# Patient Record
Sex: MALE | Race: WHITE | NOT HISPANIC OR LATINO | URBAN - METROPOLITAN AREA
[De-identification: names, ages, dates, MRNs, and addresses within clinical notes are randomized per-mention and may not be internally consistent; named-entity substitution may affect disease eponyms.]

---

## 2018-03-30 ENCOUNTER — INPATIENT (INPATIENT)
Facility: HOSPITAL | Age: 43
LOS: 6 days | Discharge: HOME | End: 2018-04-06
Attending: PSYCHIATRY & NEUROLOGY | Admitting: PSYCHIATRY & NEUROLOGY
Payer: MEDICARE

## 2018-03-30 VITALS
TEMPERATURE: 98 F | OXYGEN SATURATION: 99 % | HEART RATE: 89 BPM | RESPIRATION RATE: 18 BRPM | SYSTOLIC BLOOD PRESSURE: 156 MMHG | DIASTOLIC BLOOD PRESSURE: 93 MMHG

## 2018-03-30 DIAGNOSIS — F33.9 MAJOR DEPRESSIVE DISORDER, RECURRENT, UNSPECIFIED: ICD-10-CM

## 2018-03-30 DIAGNOSIS — R69 ILLNESS, UNSPECIFIED: ICD-10-CM

## 2018-03-30 DIAGNOSIS — F25.0 SCHIZOAFFECTIVE DISORDER, BIPOLAR TYPE: ICD-10-CM

## 2018-03-30 LAB
ALBUMIN SERPL ELPH-MCNC: 4.7 G/DL — SIGNIFICANT CHANGE UP (ref 3.3–5)
ALP SERPL-CCNC: 75 U/L — SIGNIFICANT CHANGE UP (ref 40–120)
ALT FLD-CCNC: 33 U/L — SIGNIFICANT CHANGE UP (ref 4–41)
AMPHET UR-MCNC: NEGATIVE — SIGNIFICANT CHANGE UP
APAP SERPL-MCNC: < 15 UG/ML — LOW (ref 15–25)
APPEARANCE UR: CLEAR — SIGNIFICANT CHANGE UP
AST SERPL-CCNC: 20 U/L — SIGNIFICANT CHANGE UP (ref 4–40)
BARBITURATES UR SCN-MCNC: NEGATIVE — SIGNIFICANT CHANGE UP
BASOPHILS # BLD AUTO: 0.05 K/UL — SIGNIFICANT CHANGE UP (ref 0–0.2)
BASOPHILS NFR BLD AUTO: 0.7 % — SIGNIFICANT CHANGE UP (ref 0–2)
BENZODIAZ UR-MCNC: NEGATIVE — SIGNIFICANT CHANGE UP
BILIRUB SERPL-MCNC: 0.9 MG/DL — SIGNIFICANT CHANGE UP (ref 0.2–1.2)
BILIRUB UR-MCNC: NEGATIVE — SIGNIFICANT CHANGE UP
BLOOD UR QL VISUAL: NEGATIVE — SIGNIFICANT CHANGE UP
BUN SERPL-MCNC: 18 MG/DL — SIGNIFICANT CHANGE UP (ref 7–23)
CALCIUM SERPL-MCNC: 9.9 MG/DL — SIGNIFICANT CHANGE UP (ref 8.4–10.5)
CANNABINOIDS UR-MCNC: NEGATIVE — SIGNIFICANT CHANGE UP
CHLORIDE SERPL-SCNC: 101 MMOL/L — SIGNIFICANT CHANGE UP (ref 98–107)
CO2 SERPL-SCNC: 27 MMOL/L — SIGNIFICANT CHANGE UP (ref 22–31)
COCAINE METAB.OTHER UR-MCNC: NEGATIVE — SIGNIFICANT CHANGE UP
COLOR SPEC: YELLOW — SIGNIFICANT CHANGE UP
CREAT SERPL-MCNC: 0.96 MG/DL — SIGNIFICANT CHANGE UP (ref 0.5–1.3)
EOSINOPHIL # BLD AUTO: 0.2 K/UL — SIGNIFICANT CHANGE UP (ref 0–0.5)
EOSINOPHIL NFR BLD AUTO: 2.8 % — SIGNIFICANT CHANGE UP (ref 0–6)
ETHANOL BLD-MCNC: < 10 MG/DL — SIGNIFICANT CHANGE UP
GLUCOSE SERPL-MCNC: 98 MG/DL — SIGNIFICANT CHANGE UP (ref 70–99)
GLUCOSE UR-MCNC: NEGATIVE — SIGNIFICANT CHANGE UP
HCT VFR BLD CALC: 49.9 % — SIGNIFICANT CHANGE UP (ref 39–50)
HGB BLD-MCNC: 16 G/DL — SIGNIFICANT CHANGE UP (ref 13–17)
IMM GRANULOCYTES # BLD AUTO: 0.01 # — SIGNIFICANT CHANGE UP
IMM GRANULOCYTES NFR BLD AUTO: 0.1 % — SIGNIFICANT CHANGE UP (ref 0–1.5)
KETONES UR-MCNC: NEGATIVE — SIGNIFICANT CHANGE UP
LEUKOCYTE ESTERASE UR-ACNC: NEGATIVE — SIGNIFICANT CHANGE UP
LYMPHOCYTES # BLD AUTO: 2.18 K/UL — SIGNIFICANT CHANGE UP (ref 1–3.3)
LYMPHOCYTES # BLD AUTO: 30.2 % — SIGNIFICANT CHANGE UP (ref 13–44)
MCHC RBC-ENTMCNC: 29.5 PG — SIGNIFICANT CHANGE UP (ref 27–34)
MCHC RBC-ENTMCNC: 32.1 % — SIGNIFICANT CHANGE UP (ref 32–36)
MCV RBC AUTO: 92.1 FL — SIGNIFICANT CHANGE UP (ref 80–100)
METHADONE UR-MCNC: NEGATIVE — SIGNIFICANT CHANGE UP
MONOCYTES # BLD AUTO: 0.51 K/UL — SIGNIFICANT CHANGE UP (ref 0–0.9)
MONOCYTES NFR BLD AUTO: 7.1 % — SIGNIFICANT CHANGE UP (ref 2–14)
MUCOUS THREADS # UR AUTO: SIGNIFICANT CHANGE UP
NEUTROPHILS # BLD AUTO: 4.28 K/UL — SIGNIFICANT CHANGE UP (ref 1.8–7.4)
NEUTROPHILS NFR BLD AUTO: 59.1 % — SIGNIFICANT CHANGE UP (ref 43–77)
NITRITE UR-MCNC: NEGATIVE — SIGNIFICANT CHANGE UP
NRBC # FLD: 0 — SIGNIFICANT CHANGE UP
OPIATES UR-MCNC: NEGATIVE — SIGNIFICANT CHANGE UP
OXYCODONE UR-MCNC: NEGATIVE — SIGNIFICANT CHANGE UP
PCP UR-MCNC: NEGATIVE — SIGNIFICANT CHANGE UP
PH UR: 6 — SIGNIFICANT CHANGE UP (ref 4.6–8)
PLATELET # BLD AUTO: 225 K/UL — SIGNIFICANT CHANGE UP (ref 150–400)
PMV BLD: 10.2 FL — SIGNIFICANT CHANGE UP (ref 7–13)
POTASSIUM SERPL-MCNC: 4.3 MMOL/L — SIGNIFICANT CHANGE UP (ref 3.5–5.3)
POTASSIUM SERPL-SCNC: 4.3 MMOL/L — SIGNIFICANT CHANGE UP (ref 3.5–5.3)
PROT SERPL-MCNC: 7.1 G/DL — SIGNIFICANT CHANGE UP (ref 6–8.3)
PROT UR-MCNC: NEGATIVE MG/DL — SIGNIFICANT CHANGE UP
RBC # BLD: 5.42 M/UL — SIGNIFICANT CHANGE UP (ref 4.2–5.8)
RBC # FLD: 12.5 % — SIGNIFICANT CHANGE UP (ref 10.3–14.5)
RBC CASTS # UR COMP ASSIST: SIGNIFICANT CHANGE UP (ref 0–?)
SALICYLATES SERPL-MCNC: < 5 MG/DL — LOW (ref 15–30)
SODIUM SERPL-SCNC: 141 MMOL/L — SIGNIFICANT CHANGE UP (ref 135–145)
SP GR SPEC: 1.01 — SIGNIFICANT CHANGE UP (ref 1–1.04)
TSH SERPL-MCNC: 0.64 UIU/ML — SIGNIFICANT CHANGE UP (ref 0.27–4.2)
UROBILINOGEN FLD QL: NORMAL MG/DL — SIGNIFICANT CHANGE UP
WBC # BLD: 7.23 K/UL — SIGNIFICANT CHANGE UP (ref 3.8–10.5)
WBC # FLD AUTO: 7.23 K/UL — SIGNIFICANT CHANGE UP (ref 3.8–10.5)

## 2018-03-30 PROCEDURE — 99285 EMERGENCY DEPT VISIT HI MDM: CPT

## 2018-03-30 RX ORDER — HYDROXYZINE HCL 10 MG
50 TABLET ORAL EVERY 6 HOURS
Qty: 0 | Refills: 0 | Status: DISCONTINUED | OUTPATIENT
Start: 2018-03-30 | End: 2018-04-06

## 2018-03-30 RX ORDER — ZOLPIDEM TARTRATE 10 MG/1
5 TABLET ORAL AT BEDTIME
Qty: 0 | Refills: 0 | Status: DISCONTINUED | OUTPATIENT
Start: 2018-03-30 | End: 2018-04-04

## 2018-03-30 RX ORDER — HALOPERIDOL DECANOATE 100 MG/ML
5 INJECTION INTRAMUSCULAR ONCE
Qty: 0 | Refills: 0 | Status: DISCONTINUED | OUTPATIENT
Start: 2018-03-30 | End: 2018-04-06

## 2018-03-30 RX ORDER — CLONAZEPAM 1 MG
0.5 TABLET ORAL ONCE
Qty: 0 | Refills: 0 | Status: DISCONTINUED | OUTPATIENT
Start: 2018-03-30 | End: 2018-03-30

## 2018-03-30 RX ORDER — CLONAZEPAM 1 MG
0.5 TABLET ORAL EVERY 8 HOURS
Qty: 0 | Refills: 0 | Status: DISCONTINUED | OUTPATIENT
Start: 2018-03-30 | End: 2018-04-04

## 2018-03-30 RX ORDER — CLONAZEPAM 1 MG
0.5 TABLET ORAL
Qty: 0 | Refills: 0 | Status: DISCONTINUED | OUTPATIENT
Start: 2018-03-30 | End: 2018-04-02

## 2018-03-30 RX ORDER — HALOPERIDOL DECANOATE 100 MG/ML
5 INJECTION INTRAMUSCULAR
Qty: 0 | Refills: 0 | Status: DISCONTINUED | OUTPATIENT
Start: 2018-03-30 | End: 2018-03-31

## 2018-03-30 RX ORDER — HALOPERIDOL DECANOATE 100 MG/ML
5 INJECTION INTRAMUSCULAR EVERY 6 HOURS
Qty: 0 | Refills: 0 | Status: DISCONTINUED | OUTPATIENT
Start: 2018-03-30 | End: 2018-04-06

## 2018-03-30 RX ORDER — LANOLIN ALCOHOL/MO/W.PET/CERES
1 CREAM (GRAM) TOPICAL AT BEDTIME
Qty: 0 | Refills: 0 | Status: DISCONTINUED | OUTPATIENT
Start: 2018-03-30 | End: 2018-04-02

## 2018-03-30 RX ORDER — DIPHENHYDRAMINE HCL 50 MG
50 CAPSULE ORAL ONCE
Qty: 0 | Refills: 0 | Status: DISCONTINUED | OUTPATIENT
Start: 2018-03-30 | End: 2018-04-06

## 2018-03-30 RX ADMIN — HALOPERIDOL DECANOATE 5 MILLIGRAM(S): 100 INJECTION INTRAMUSCULAR at 21:45

## 2018-03-30 RX ADMIN — HALOPERIDOL DECANOATE 5 MILLIGRAM(S): 100 INJECTION INTRAMUSCULAR at 21:43

## 2018-03-30 RX ADMIN — Medication 0.5 MILLIGRAM(S): at 16:23

## 2018-03-30 RX ADMIN — ZOLPIDEM TARTRATE 5 MILLIGRAM(S): 10 TABLET ORAL at 21:44

## 2018-03-30 RX ADMIN — Medication 0.5 MILLIGRAM(S): at 21:43

## 2018-03-30 RX ADMIN — Medication 0.5 MILLIGRAM(S): at 21:45

## 2018-03-30 RX ADMIN — Medication 1 MILLIGRAM(S): at 22:18

## 2018-03-30 NOTE — ED PROVIDER NOTE - MEDICAL DECISION MAKING DETAILS
43 y/o M hx  Schizophrenia   Labs, Urine Tox/UA, EKG . Small superficial linear abrasion to left wrist. No indication for sutures.    Medical evaluation performed. There is no clinical evidence of intoxication or any acute medical problem requiring immediate intervention. Patient is awaiting psychiatric consultation. Final disposition will be determined by psychiatrist.

## 2018-03-30 NOTE — ED ADULT NURSE REASSESSMENT NOTE - NS ED NURSE REASSESS COMMENT FT1
Patient medication given as ordered, pt admitted to [place] [id number], report given to Moncho TONEY, pt transported to [place] [id number] via EMS, safety maintained.

## 2018-03-30 NOTE — ED BEHAVIORAL HEALTH ASSESSMENT NOTE - CASE SUMMARY
42M with schizoaffective disorder, sent by outpatient psychiatrist for suicidal threats and medication non compliance. Patient has psychosocial stressors of an ill parent and sister staying with him to help care for parent. He has been refusing medications for some time. He has been increasingly stressed and sent his sister a text threatening suicide. Patient is quite guarded and attempting to rationalize events, however he is at extremely high risk for suicide given his history of serious attempt. He also appears to have a superficial lac on wrist, which he states is from a bracelet but may be self inflicted. He is an acute risk of harm to himself and requires admission for safety and stabilization. I agree with plan as above. Patient meets criteria for 939 but does not need constant observation in a locked supervised setting. EMS transport.

## 2018-03-30 NOTE — ED ADULT NURSE NOTE - CHIEF COMPLAINT QUOTE
Patient brought to ER from [facility] by EMS. Patient sent depressed text messages to sister and has history of suicidal attempts so sister called EMS. Pt states that he does not need to be here.

## 2018-03-30 NOTE — ED BEHAVIORAL HEALTH ASSESSMENT NOTE - DETAILS
family hx of anxiety and depression Dr. Ahumada agrees suicide attempt in past by stabbing self Dr Goldman

## 2018-03-30 NOTE — ED ADULT NURSE NOTE - NSSISCREENINGQ1_ED_A_ED
Nursing Admit Note:17  yr. old admitted to Partial treatment after D/C from 6A .  History of OD. Multiple suicide attempts  .  Stressors include family and school.  NKDA.  On Wellbutrin XL, Prazosin, Hydroxyzine, and Melatonin .  See admit form for details.  A: Anxious mood and flat affect.  I:  Oriented to unit.  P:  Family therapy, positive coping skills, increase self-esteem, gain social skills, med monitoring, monitor drug use (past history), monitor safety, school planning.   No

## 2018-03-30 NOTE — ED ADULT NURSE NOTE - OBJECTIVE STATEMENT
Received pt in  pt calm & cooperative sent from  for eval pt denies Si/Hi/AVh at present eval on going.

## 2018-03-30 NOTE — ED BEHAVIORAL HEALTH ASSESSMENT NOTE - DESCRIPTION
sleep apnea see hpi superficially cooperative, guarded  ICU Vital Signs Last 24 Hrs  T(C): 36.7 (30 Mar 2018 12:24), Max: 36.7 (30 Mar 2018 12:24)  T(F): 98 (30 Mar 2018 12:24), Max: 98 (30 Mar 2018 12:24)  HR: 89 (30 Mar 2018 12:24) (89 - 89)  BP: 156/93 (30 Mar 2018 12:24) (156/93 - 156/93)  BP(mean): --  ABP: --  ABP(mean): --  RR: 18 (30 Mar 2018 12:24) (18 - 18)  SpO2: 99% (30 Mar 2018 12:24) (99% - 99%)

## 2018-03-30 NOTE — ED BEHAVIORAL HEALTH ASSESSMENT NOTE - PSYCHIATRIC ISSUES AND PLAN (INCLUDE STANDING AND PRN MEDICATION)
Restart Haldol 5mg BID for psychosis, consider KIMBROUGH & pursuing AOT order,Continue Klonopin 0.5mg BID. Continue Ambien CR 12.5mg q HS. Provide Haldol 5mg q 6 hours PRN for agitation (IM or p.o.), Ativan 2mg q 6 hours PRN for agitation (IM or p.o.), Benadryl 50mg q 6 hours PRN for agitation (IM or p.o.)

## 2018-03-30 NOTE — ED PROVIDER NOTE - OBJECTIVE STATEMENT
43 y/o M hx  Schizophrenia BIBA from [facility]  w c/o worsening depressive symptoms  and suicidal ideations . EMS reported that patient sent his sister multiple suicidal statements via text. Admits to medication compliance. Denies falling, punching or kicking any objects. Denies pain, SOB, fever, chills, chest/abdominal discomfort. Denies HI/AH/VH. Denies use of alcohol  or illicit drug.

## 2018-03-30 NOTE — ED BEHAVIORAL HEALTH ASSESSMENT NOTE - SUMMARY
43 y/o single white male, domiciled with parents, no dependents, disabled, history of Schizoaffective Disorder, in outpatient psychiatric treatment with Dr. Ahumada, multiple past psychiatric admissions, history of AOT (ended last year), history of serious suicide attempt in 2013 (stabbed self in chest), no violence hx, no legal problems, no drugs, no ETOH, no DTs, PMH of sleep apnea, BIB EMS from outpatient clinic for evaluation as sister reported patient has been making suicidal statements in context of suspected medication noncompliance.  In ED, patient is guarded, evasive, superficially cooperative; but admits to making statements about dying on text message. Sister is stating patient has been giving away passwords & stating something may happen to him; has been stating that he is having suicidal thoughts & being attacked. She states this is not baseline for patient & is very concerned. Dr. Ahumada, who has treated patient for years, is also concerned for pt's safety and supports admission.   Given pt's history, his presentation in ED & given recent concerning statements, cannot safely discharge this patient. Patient will benefit from inpatient psychiatric hospitalization for safety and stabilization. Recommend pursuing AOT order given noncompliance.

## 2018-03-30 NOTE — ED BEHAVIORAL HEALTH ASSESSMENT NOTE - HPI (INCLUDE ILLNESS QUALITY, SEVERITY, DURATION, TIMING, CONTEXT, MODIFYING FACTORS, ASSOCIATED SIGNS AND SYMPTOMS)
43 y/o single white male, domiciled with parents, no dependents, disabled, history of Schizoaffective Disorder, in outpatient psychiatric treatment with Dr. Ahumada, multiple past psychiatric admissions, history of AOT (ended last year), history of serious suicide attempt in 2013 (stabbed self in chest), no violence hx, no legal problems, no drugs, no ETOH, no DTs, PMH of sleep apnea, BIB EMS from outpatient clinic for evaluation as sister reported patient has been making suicidal statements in context of suspected medication noncompliance.    As per collateral obtained by [facility] from Dr. Ahumada (see  note for full details), patient has a history of medication noncompliance when AOT order is discontinued & subsequent destabilization. Dr. Ahumada reports she has been working with patient for years & believes he is not taking psychiatric medications. She reported that she is concerned about patient and supports psychiatric hospitalization. She states sister, who she believes to be reliable, has been reporting patient has been texting suicidal statements & behaving in a bizarre manner recently.   Writer also spoke with Dr. Ahumada & she confirmed that at this time she supports admission for patient. She states that immediately after patient no longer was court mandated to take medication, she believes he stopped taking them. She states she did not agree with the decision to end AOT.    Writer spoke to sister who states that for the past 9 months, patient has been more agitated, delusional & not at baseline. She states that when patient is not on a Decanoate, he immediately stops taking medications. She states that for months, patient has not been well, but she has not been particularly concerned until recently. She states that their father had a stroke recently & therefore she has been staying in the home with him. She states he has been sending her text messages saying he is suicidal, being psychically attacked & reporting dark forces are surrounding him. She states patient has been sending her his passwords to different accounts; she states that he says that something might happen to him & wants family to have access. She states she feels he will hurt himself if discharged. She states that patient needs to have AOT reinstated as he she believes he will become noncompliant again.    In ED, patient is tangential & grandiose & guarded. He is vague about recent events & repeatedly gives contradicting information. He states that he is "totally fine" & requests immediate discharge. He states that he sent his sister some "dramatic text messages" & admits he may have talked about dying but is flippant about the importance of the messages. He states that he has not been sleeping well & states this is because his sister is causing loud noises in the home. He states he has been irritable, with low energy & feeling down. He reports he has been wearing "q-links" on his wrists, ankles & around his abdomen to help to prevent electronic devices from causing him harm, but does not elaborate on exactly how he could be harmed. However, he contradicts himself later as he states he has been feeling "great", is working on many projects including wanting to make paintings, write poetry, have children, get . Patient denies any other psychiatric symptoms and states the only thing that will help him is being discharged.

## 2018-03-30 NOTE — ED BEHAVIORAL HEALTH ASSESSMENT NOTE - CURRENT MEDICATION
suspected to be noncompliant with:  Haldol 10mg q HS, Amitritypline 75mg q HS, Buspar 10mg bid, Gabapentin 100mg TID, Inderal LA 60mg qd, Klonopin 0.5mg TID, Remeron 15mg q HS, Saphris 5mg q HS  Patient reports taking Ambien CR 12.5mg q HS

## 2018-03-30 NOTE — ED BEHAVIORAL HEALTH NOTE - BEHAVIORAL HEALTH NOTE
Spoke with Dr. Ahumada- Patient has schizoaffective disorder and 5 hospitalizations and 1 very SA where he stabbed himself in the chest. Patient is on AOT and does really well and as soon as he goes off he stops all his medication which is what happened. Patient is lying to her about taking medication and his sister is living with him and very concerned about his behavior. Patient texts suicidal statements to sister over the last month but then denying ideation when you ask him. She does think patient has schizotypal personality disorder and possibly narcissism but patient is suicidal, depressed and acting bizarre. Patient has been barricading room with box and speaking with an accent on his phone and spending lots of money ran 20k in debt. Patient is denying all this all her information from the sister.

## 2018-03-30 NOTE — ED BEHAVIORAL HEALTH ASSESSMENT NOTE - RISK ASSESSMENT
Risk factors include history of suicide attempt, hospitalization, severe mental illness, medication noncompliance, suicidal statements. This patient is at risk for harm and requires inpatient level of care for safety and stabilization.

## 2018-03-31 PROCEDURE — 99223 1ST HOSP IP/OBS HIGH 75: CPT

## 2018-03-31 RX ORDER — HALOPERIDOL DECANOATE 100 MG/ML
10 INJECTION INTRAMUSCULAR AT BEDTIME
Qty: 0 | Refills: 0 | Status: DISCONTINUED | OUTPATIENT
Start: 2018-03-31 | End: 2018-04-06

## 2018-03-31 RX ORDER — ASENAPINE MALEATE 10 MG/1
5 TABLET SUBLINGUAL
Qty: 0 | Refills: 0 | Status: DISCONTINUED | OUTPATIENT
Start: 2018-03-31 | End: 2018-03-31

## 2018-03-31 RX ADMIN — ZOLPIDEM TARTRATE 5 MILLIGRAM(S): 10 TABLET ORAL at 20:51

## 2018-03-31 RX ADMIN — Medication 1 MILLIGRAM(S): at 20:51

## 2018-03-31 RX ADMIN — Medication 0.5 MILLIGRAM(S): at 08:25

## 2018-03-31 RX ADMIN — ZOLPIDEM TARTRATE 5 MILLIGRAM(S): 10 TABLET ORAL at 22:14

## 2018-03-31 RX ADMIN — Medication 0.5 MILLIGRAM(S): at 20:51

## 2018-03-31 RX ADMIN — ZOLPIDEM TARTRATE 5 MILLIGRAM(S): 10 TABLET ORAL at 01:25

## 2018-03-31 RX ADMIN — HALOPERIDOL DECANOATE 10 MILLIGRAM(S): 100 INJECTION INTRAMUSCULAR at 20:51

## 2018-03-31 RX ADMIN — Medication 0.5 MILLIGRAM(S): at 18:53

## 2018-03-31 RX ADMIN — HALOPERIDOL DECANOATE 5 MILLIGRAM(S): 100 INJECTION INTRAMUSCULAR at 20:51

## 2018-03-31 RX ADMIN — Medication 50 MILLIGRAM(S): at 05:30

## 2018-03-31 RX ADMIN — HALOPERIDOL DECANOATE 5 MILLIGRAM(S): 100 INJECTION INTRAMUSCULAR at 08:26

## 2018-04-01 PROCEDURE — 99232 SBSQ HOSP IP/OBS MODERATE 35: CPT

## 2018-04-01 RX ADMIN — HALOPERIDOL DECANOATE 10 MILLIGRAM(S): 100 INJECTION INTRAMUSCULAR at 20:58

## 2018-04-01 RX ADMIN — ZOLPIDEM TARTRATE 5 MILLIGRAM(S): 10 TABLET ORAL at 20:58

## 2018-04-01 RX ADMIN — Medication 0.5 MILLIGRAM(S): at 16:27

## 2018-04-01 RX ADMIN — Medication 50 MILLIGRAM(S): at 06:37

## 2018-04-01 RX ADMIN — Medication 1 MILLIGRAM(S): at 20:58

## 2018-04-01 RX ADMIN — ZOLPIDEM TARTRATE 5 MILLIGRAM(S): 10 TABLET ORAL at 23:36

## 2018-04-01 RX ADMIN — Medication 0.5 MILLIGRAM(S): at 08:41

## 2018-04-01 RX ADMIN — ZOLPIDEM TARTRATE 5 MILLIGRAM(S): 10 TABLET ORAL at 03:46

## 2018-04-01 RX ADMIN — Medication 0.5 MILLIGRAM(S): at 20:58

## 2018-04-02 PROCEDURE — 99232 SBSQ HOSP IP/OBS MODERATE 35: CPT

## 2018-04-02 RX ORDER — LANOLIN ALCOHOL/MO/W.PET/CERES
5 CREAM (GRAM) TOPICAL AT BEDTIME
Qty: 0 | Refills: 0 | Status: DISCONTINUED | OUTPATIENT
Start: 2018-04-02 | End: 2018-04-06

## 2018-04-02 RX ORDER — CLONAZEPAM 1 MG
0.5 TABLET ORAL DAILY
Qty: 0 | Refills: 0 | Status: DISCONTINUED | OUTPATIENT
Start: 2018-04-02 | End: 2018-04-06

## 2018-04-02 RX ORDER — CLONAZEPAM 1 MG
1 TABLET ORAL AT BEDTIME
Qty: 0 | Refills: 0 | Status: DISCONTINUED | OUTPATIENT
Start: 2018-04-02 | End: 2018-04-06

## 2018-04-02 RX ORDER — HALOPERIDOL DECANOATE 100 MG/ML
100 INJECTION INTRAMUSCULAR ONCE
Qty: 0 | Refills: 0 | Status: COMPLETED | OUTPATIENT
Start: 2018-04-02 | End: 2018-04-02

## 2018-04-02 RX ADMIN — Medication 5 MILLIGRAM(S): at 20:44

## 2018-04-02 RX ADMIN — ZOLPIDEM TARTRATE 5 MILLIGRAM(S): 10 TABLET ORAL at 23:01

## 2018-04-02 RX ADMIN — Medication 1 MILLIGRAM(S): at 20:45

## 2018-04-02 RX ADMIN — HALOPERIDOL DECANOATE 10 MILLIGRAM(S): 100 INJECTION INTRAMUSCULAR at 20:44

## 2018-04-02 RX ADMIN — Medication 0.5 MILLIGRAM(S): at 08:54

## 2018-04-02 RX ADMIN — Medication 50 MILLIGRAM(S): at 00:57

## 2018-04-02 RX ADMIN — HALOPERIDOL DECANOATE 100 MILLIGRAM(S): 100 INJECTION INTRAMUSCULAR at 15:00

## 2018-04-02 RX ADMIN — Medication 0.5 MILLIGRAM(S): at 13:05

## 2018-04-02 RX ADMIN — ZOLPIDEM TARTRATE 5 MILLIGRAM(S): 10 TABLET ORAL at 20:44

## 2018-04-03 PROCEDURE — 99232 SBSQ HOSP IP/OBS MODERATE 35: CPT

## 2018-04-03 RX ORDER — ACETAMINOPHEN 500 MG
650 TABLET ORAL EVERY 6 HOURS
Qty: 0 | Refills: 0 | Status: DISCONTINUED | OUTPATIENT
Start: 2018-04-03 | End: 2018-04-06

## 2018-04-03 RX ADMIN — ZOLPIDEM TARTRATE 5 MILLIGRAM(S): 10 TABLET ORAL at 22:14

## 2018-04-03 RX ADMIN — Medication 0.5 MILLIGRAM(S): at 08:30

## 2018-04-03 RX ADMIN — ZOLPIDEM TARTRATE 5 MILLIGRAM(S): 10 TABLET ORAL at 22:38

## 2018-04-03 RX ADMIN — HALOPERIDOL DECANOATE 10 MILLIGRAM(S): 100 INJECTION INTRAMUSCULAR at 22:14

## 2018-04-03 RX ADMIN — Medication 50 MILLIGRAM(S): at 23:37

## 2018-04-03 RX ADMIN — Medication 5 MILLIGRAM(S): at 22:14

## 2018-04-03 RX ADMIN — Medication 1 MILLIGRAM(S): at 22:14

## 2018-04-03 RX ADMIN — Medication 50 MILLIGRAM(S): at 02:21

## 2018-04-04 PROCEDURE — 99232 SBSQ HOSP IP/OBS MODERATE 35: CPT

## 2018-04-04 RX ORDER — ZOLPIDEM TARTRATE 10 MG/1
5 TABLET ORAL AT BEDTIME
Qty: 0 | Refills: 0 | Status: DISCONTINUED | OUTPATIENT
Start: 2018-04-04 | End: 2018-04-06

## 2018-04-04 RX ORDER — CLONAZEPAM 1 MG
0.5 TABLET ORAL EVERY 8 HOURS
Qty: 0 | Refills: 0 | Status: DISCONTINUED | OUTPATIENT
Start: 2018-04-04 | End: 2018-04-06

## 2018-04-04 RX ADMIN — Medication 650 MILLIGRAM(S): at 18:45

## 2018-04-04 RX ADMIN — ZOLPIDEM TARTRATE 5 MILLIGRAM(S): 10 TABLET ORAL at 21:55

## 2018-04-04 RX ADMIN — Medication 0.5 MILLIGRAM(S): at 08:34

## 2018-04-04 RX ADMIN — HALOPERIDOL DECANOATE 10 MILLIGRAM(S): 100 INJECTION INTRAMUSCULAR at 20:20

## 2018-04-04 RX ADMIN — Medication 1 MILLIGRAM(S): at 20:20

## 2018-04-04 RX ADMIN — ZOLPIDEM TARTRATE 5 MILLIGRAM(S): 10 TABLET ORAL at 20:20

## 2018-04-04 RX ADMIN — Medication 0.5 MILLIGRAM(S): at 18:45

## 2018-04-04 RX ADMIN — Medication 50 MILLIGRAM(S): at 23:05

## 2018-04-04 RX ADMIN — Medication 5 MILLIGRAM(S): at 20:20

## 2018-04-05 PROCEDURE — 99232 SBSQ HOSP IP/OBS MODERATE 35: CPT

## 2018-04-05 RX ORDER — CLONAZEPAM 1 MG
1 TABLET ORAL
Qty: 45 | Refills: 0 | OUTPATIENT
Start: 2018-04-05 | End: 2018-04-19

## 2018-04-05 RX ORDER — HALOPERIDOL DECANOATE 100 MG/ML
50 INJECTION INTRAMUSCULAR ONCE
Qty: 0 | Refills: 0 | Status: COMPLETED | OUTPATIENT
Start: 2018-04-06 | End: 2018-04-06

## 2018-04-05 RX ORDER — HALOPERIDOL DECANOATE 100 MG/ML
1 INJECTION INTRAMUSCULAR
Qty: 0 | Refills: 0 | COMMUNITY

## 2018-04-05 RX ORDER — DIPHENHYDRAMINE HCL 50 MG
25 CAPSULE ORAL ONCE
Qty: 0 | Refills: 0 | Status: COMPLETED | OUTPATIENT
Start: 2018-04-05 | End: 2018-04-05

## 2018-04-05 RX ORDER — HYDROXYZINE HCL 10 MG
1 TABLET ORAL
Qty: 15 | Refills: 0 | OUTPATIENT
Start: 2018-04-05 | End: 2018-04-19

## 2018-04-05 RX ORDER — HALOPERIDOL DECANOATE 100 MG/ML
1 INJECTION INTRAMUSCULAR
Qty: 15 | Refills: 0 | OUTPATIENT
Start: 2018-04-05 | End: 2018-04-19

## 2018-04-05 RX ORDER — ZOLPIDEM TARTRATE 10 MG/1
1 TABLET ORAL
Qty: 15 | Refills: 0 | OUTPATIENT
Start: 2018-04-05 | End: 2018-04-19

## 2018-04-05 RX ADMIN — Medication 25 MILLIGRAM(S): at 02:18

## 2018-04-05 RX ADMIN — ZOLPIDEM TARTRATE 5 MILLIGRAM(S): 10 TABLET ORAL at 20:19

## 2018-04-05 RX ADMIN — Medication 650 MILLIGRAM(S): at 14:18

## 2018-04-05 RX ADMIN — Medication 0.5 MILLIGRAM(S): at 08:36

## 2018-04-05 RX ADMIN — Medication 5 MILLIGRAM(S): at 20:19

## 2018-04-05 RX ADMIN — HALOPERIDOL DECANOATE 10 MILLIGRAM(S): 100 INJECTION INTRAMUSCULAR at 20:19

## 2018-04-05 RX ADMIN — Medication 0.5 MILLIGRAM(S): at 17:49

## 2018-04-05 RX ADMIN — Medication 1 MILLIGRAM(S): at 20:19

## 2018-04-05 RX ADMIN — ZOLPIDEM TARTRATE 5 MILLIGRAM(S): 10 TABLET ORAL at 21:48

## 2018-04-06 VITALS — TEMPERATURE: 98 F

## 2018-04-06 PROCEDURE — 99232 SBSQ HOSP IP/OBS MODERATE 35: CPT

## 2018-04-06 RX ORDER — IBUPROFEN 200 MG
600 TABLET ORAL ONCE
Qty: 0 | Refills: 0 | Status: COMPLETED | OUTPATIENT
Start: 2018-04-06 | End: 2018-04-06

## 2018-04-06 RX ADMIN — Medication 0.5 MILLIGRAM(S): at 08:56

## 2018-04-06 RX ADMIN — Medication 600 MILLIGRAM(S): at 07:54

## 2018-04-06 RX ADMIN — Medication 600 MILLIGRAM(S): at 08:15

## 2018-04-06 RX ADMIN — Medication 650 MILLIGRAM(S): at 02:26

## 2018-04-06 RX ADMIN — HALOPERIDOL DECANOATE 50 MILLIGRAM(S): 100 INJECTION INTRAMUSCULAR at 11:44

## 2018-04-06 RX ADMIN — Medication 50 MILLIGRAM(S): at 00:20

## 2018-04-06 RX ADMIN — Medication 650 MILLIGRAM(S): at 01:25

## 2018-06-01 PROCEDURE — 99214 OFFICE O/P EST MOD 30 MIN: CPT

## 2018-10-16 PROCEDURE — 99214 OFFICE O/P EST MOD 30 MIN: CPT

## 2019-01-14 ENCOUNTER — RESULT CHARGE (OUTPATIENT)
Age: 44
End: 2019-01-14

## 2019-01-15 ENCOUNTER — RX CHANGE (OUTPATIENT)
Age: 44
End: 2019-01-15

## 2019-01-15 ENCOUNTER — APPOINTMENT (OUTPATIENT)
Dept: PULMONOLOGY | Facility: CLINIC | Age: 44
End: 2019-01-15
Payer: MEDICARE

## 2019-01-15 DIAGNOSIS — R04.2 HEMOPTYSIS: ICD-10-CM

## 2019-01-15 LAB — POCT - HEMOGLOBIN (HGB), QUANTITATIVE, TRANSCUTANEOUS: 14.3

## 2019-01-15 PROCEDURE — 94727 GAS DIL/WSHOT DETER LNG VOL: CPT

## 2019-01-15 PROCEDURE — 94729 DIFFUSING CAPACITY: CPT

## 2019-01-15 PROCEDURE — 88738 HGB QUANT TRANSCUTANEOUS: CPT

## 2019-01-15 PROCEDURE — 71046 X-RAY EXAM CHEST 2 VIEWS: CPT

## 2019-01-15 PROCEDURE — 99204 OFFICE O/P NEW MOD 45 MIN: CPT | Mod: 25

## 2019-01-15 PROCEDURE — 94060 EVALUATION OF WHEEZING: CPT

## 2019-01-15 RX ORDER — DEXTROAMPHETAMINE SACCHARATE, AMPHETAMINE ASPARTATE, DEXTROAMPHETAMINE SULFATE, AND AMPHETAMINE SULFATE 7.5; 7.5; 7.5; 7.5 MG/1; MG/1; MG/1; MG/1
30 TABLET ORAL
Refills: 0 | Status: ACTIVE | COMMUNITY

## 2019-01-15 NOTE — PROCEDURE
[FreeTextEntry1] : CXR: left retrocardiac density and linear atelectasis, no pleural effusions, cardiac silhouette appears normal.  No bony abnormality.\par \par PFT: restrictive dysfunction without a significant bronchodilator response.  Lung volumes low with evidence of air trapping. DLCO normal.\par

## 2019-01-15 NOTE — PHYSICAL EXAM
[Well Groomed] : well groomed [General Appearance - In No Acute Distress] : no acute distress [Heart Sounds] : normal S1 and S2 [] : no respiratory distress [FreeTextEntry1] : crackles at bases [Nail Clubbing] : no clubbing of the fingernails [Cyanosis, Localized] : no localized cyanosis

## 2019-01-15 NOTE — HISTORY OF PRESENT ILLNESS
[FreeTextEntry1] : 43M Suffers from chronic fatigue syndrome and insomnia.  3 weeks ago had sore throat and cough, went to urgent care and was given amoxicillin x 7 days, also taking mucinex and caesar seltzer which seemed to have help.  Then noticed hemoptysis, 1/2 teaspoon of sputum mixed with blood x 3 days.  Today little less blood/mucous.  Had 2 episodes of shaking chills 3 days ago.  Not short of breath but congetion makes it hard to breath.  Not wheezing.  Never smoker, but lives in a home with smokers.  Is disabled from chronic fatigue syndrome.

## 2019-01-15 NOTE — CONSULT LETTER
[FreeTextEntry1] : Dear ,\par \par I had the pleasure of evaluating your patient, MAXIMILIANO KING today in pulmonary consultation.  Please refer to my attached note for my findings and recommendations.\par \par \par Thank you for allowing me to participate in the care of your patient, please feel free to call with any questions or concerns.\par \par \par Sincerely,\par \par Lula Tejeda MD\par [facility] \par [facility]\par \par

## 2019-01-22 ENCOUNTER — RX RENEWAL (OUTPATIENT)
Age: 44
End: 2019-01-22

## 2019-01-29 ENCOUNTER — APPOINTMENT (OUTPATIENT)
Dept: PULMONOLOGY | Facility: CLINIC | Age: 44
End: 2019-01-29
Payer: MEDICARE

## 2019-01-29 VITALS
OXYGEN SATURATION: 96 % | SYSTOLIC BLOOD PRESSURE: 147 MMHG | DIASTOLIC BLOOD PRESSURE: 93 MMHG | HEART RATE: 108 BPM | HEIGHT: 75 IN | WEIGHT: 300 LBS | BODY MASS INDEX: 37.3 KG/M2

## 2019-01-29 DIAGNOSIS — J18.9 PNEUMONIA, UNSPECIFIED ORGANISM: ICD-10-CM

## 2019-01-29 PROCEDURE — 94060 EVALUATION OF WHEEZING: CPT

## 2019-01-29 PROCEDURE — 99213 OFFICE O/P EST LOW 20 MIN: CPT | Mod: 25

## 2019-01-29 PROCEDURE — 71046 X-RAY EXAM CHEST 2 VIEWS: CPT

## 2019-01-29 NOTE — REASON FOR VISIT
[Follow-Up] : a follow-up visit [Abnormal Chest X-Ray] : abnormal Chest X-Ray [Cough] : cough [FreeTextEntry2] : 2nd hand smoking from mom

## 2019-01-29 NOTE — PROCEDURE
[FreeTextEntry1] : CXR: Unchanged linear density in LLL, no obvious infiltrate\par \par John: Sugg. of severe restrctive defect with no imp. PBD

## 2019-01-29 NOTE — PHYSICAL EXAM
[General Appearance - Well Developed] : well developed [Normal Appearance] : normal appearance [Well Groomed] : well groomed [General Appearance - Well Nourished] : well nourished [No Deformities] : no deformities [General Appearance - In No Acute Distress] : no acute distress [Normal Oropharynx] : normal oropharynx [Heart Rate And Rhythm] : heart rate and rhythm were normal [Heart Sounds] : normal S1 and S2 [Murmurs] : no murmurs present [Respiration, Rhythm And Depth] : normal respiratory rhythm and effort [Exaggerated Use Of Accessory Muscles For Inspiration] : no accessory muscle use [Auscultation Breath Sounds / Voice Sounds] : lungs were clear to auscultation bilaterally [Abdomen Soft] : soft [Abdomen Tenderness] : non-tender [] : no hepato-splenomegaly [Abdomen Mass (___ Cm)] : no abdominal mass palpated

## 2019-02-06 ENCOUNTER — APPOINTMENT (OUTPATIENT)
Dept: CT IMAGING | Facility: IMAGING CENTER | Age: 44
End: 2019-02-06
Payer: MEDICARE

## 2019-02-07 ENCOUNTER — FORM ENCOUNTER (OUTPATIENT)
Age: 44
End: 2019-02-07

## 2019-02-08 ENCOUNTER — APPOINTMENT (OUTPATIENT)
Dept: CT IMAGING | Facility: IMAGING CENTER | Age: 44
End: 2019-02-08
Payer: MEDICARE

## 2019-02-08 ENCOUNTER — OUTPATIENT (OUTPATIENT)
Dept: OUTPATIENT SERVICES | Facility: HOSPITAL | Age: 44
LOS: 1 days | End: 2019-02-08
Payer: MEDICARE

## 2019-02-08 DIAGNOSIS — J18.8 OTHER PNEUMONIA, UNSPECIFIED ORGANISM: ICD-10-CM

## 2019-02-08 PROCEDURE — 71250 CT THORAX DX C-: CPT | Mod: 26

## 2019-02-08 PROCEDURE — 71250 CT THORAX DX C-: CPT

## 2019-02-12 ENCOUNTER — APPOINTMENT (OUTPATIENT)
Dept: PULMONOLOGY | Facility: CLINIC | Age: 44
End: 2019-02-12
Payer: MEDICARE

## 2019-02-12 VITALS
SYSTOLIC BLOOD PRESSURE: 135 MMHG | HEART RATE: 117 BPM | TEMPERATURE: 98.2 F | RESPIRATION RATE: 16 BRPM | DIASTOLIC BLOOD PRESSURE: 82 MMHG | OXYGEN SATURATION: 97 %

## 2019-02-12 DIAGNOSIS — J40 BRONCHITIS, NOT SPECIFIED AS ACUTE OR CHRONIC: ICD-10-CM

## 2019-02-12 DIAGNOSIS — J98.11 ATELECTASIS: ICD-10-CM

## 2019-02-12 PROCEDURE — 99213 OFFICE O/P EST LOW 20 MIN: CPT | Mod: 25

## 2019-02-12 PROCEDURE — 94060 EVALUATION OF WHEEZING: CPT

## 2019-02-12 PROCEDURE — 95012 NITRIC OXIDE EXP GAS DETER: CPT

## 2019-02-12 NOTE — PROCEDURE
[FreeTextEntry1] : Ricky: Sugg. of severe rest. def. with some imp. PBD.\par \par NiOx: 7\par \par Chest CT scan on 2/8/19: As report

## 2019-02-12 NOTE — REASON FOR VISIT
[Follow-Up] : a follow-up visit [Abnormal CT Scan] : abnormal CT Scan [Cough] : cough [FreeTextEntry2] : Cough resolved

## 2019-05-14 ENCOUNTER — APPOINTMENT (OUTPATIENT)
Dept: PULMONOLOGY | Facility: CLINIC | Age: 44
End: 2019-05-14

## 2019-06-12 DIAGNOSIS — Z00.00 ENCOUNTER FOR GENERAL ADULT MEDICAL EXAMINATION W/OUT ABNORMAL FINDINGS: ICD-10-CM

## 2019-06-12 DIAGNOSIS — Z12.5 ENCOUNTER FOR SCREENING FOR MALIGNANT NEOPLASM OF PROSTATE: ICD-10-CM

## 2019-06-12 DIAGNOSIS — R74.8 ABNORMAL LEVELS OF OTHER SERUM ENZYMES: ICD-10-CM

## 2019-06-21 ENCOUNTER — RX CHANGE (OUTPATIENT)
Age: 44
End: 2019-06-21

## 2019-07-10 ENCOUNTER — APPOINTMENT (OUTPATIENT)
Dept: ENDOCRINOLOGY | Facility: CLINIC | Age: 44
End: 2019-07-10
Payer: MEDICARE

## 2019-07-10 VITALS
BODY MASS INDEX: 35.56 KG/M2 | HEIGHT: 75 IN | WEIGHT: 286 LBS | SYSTOLIC BLOOD PRESSURE: 160 MMHG | HEART RATE: 112 BPM | OXYGEN SATURATION: 98 % | DIASTOLIC BLOOD PRESSURE: 80 MMHG

## 2019-07-10 DIAGNOSIS — R74.8 ABNORMAL LEVELS OF OTHER SERUM ENZYMES: ICD-10-CM

## 2019-07-10 PROCEDURE — 99214 OFFICE O/P EST MOD 30 MIN: CPT

## 2019-07-10 PROCEDURE — 36415 COLL VENOUS BLD VENIPUNCTURE: CPT

## 2019-08-14 ENCOUNTER — APPOINTMENT (OUTPATIENT)
Dept: ENDOCRINOLOGY | Facility: CLINIC | Age: 44
End: 2019-08-14
Payer: MEDICARE

## 2019-08-14 VITALS
BODY MASS INDEX: 35.56 KG/M2 | SYSTOLIC BLOOD PRESSURE: 140 MMHG | WEIGHT: 286 LBS | OXYGEN SATURATION: 96 % | HEART RATE: 101 BPM | TEMPERATURE: 98.2 F | DIASTOLIC BLOOD PRESSURE: 85 MMHG | HEIGHT: 75 IN

## 2019-08-14 PROCEDURE — 36415 COLL VENOUS BLD VENIPUNCTURE: CPT

## 2019-08-14 PROCEDURE — 99214 OFFICE O/P EST MOD 30 MIN: CPT | Mod: 25

## 2019-08-15 ENCOUNTER — OTHER (OUTPATIENT)
Age: 44
End: 2019-08-15

## 2019-08-16 ENCOUNTER — RX RENEWAL (OUTPATIENT)
Age: 44
End: 2019-08-16

## 2019-08-16 ENCOUNTER — OTHER (OUTPATIENT)
Age: 44
End: 2019-08-16

## 2019-08-16 LAB
25(OH)D3 SERPL-MCNC: 61.3 NG/ML
ALBUMIN SERPL ELPH-MCNC: 4.7 G/DL
ALP BLD-CCNC: 96 U/L
ALT SERPL-CCNC: 43 U/L
ANION GAP SERPL CALC-SCNC: 12 MMOL/L
AST SERPL-CCNC: 24 U/L
BASOPHILS # BLD AUTO: 0.04 K/UL
BASOPHILS NFR BLD AUTO: 0.5 %
BILIRUB SERPL-MCNC: 0.2 MG/DL
BUN SERPL-MCNC: 20 MG/DL
CALCIUM SERPL-MCNC: 10.4 MG/DL
CHLORIDE SERPL-SCNC: 104 MMOL/L
CO2 SERPL-SCNC: 27 MMOL/L
CREAT SERPL-MCNC: 0.77 MG/DL
EOSINOPHIL # BLD AUTO: 0.21 K/UL
EOSINOPHIL NFR BLD AUTO: 2.8 %
GLUCOSE SERPL-MCNC: 95 MG/DL
HCT VFR BLD CALC: 47.7 %
HGB BLD-MCNC: 15.4 G/DL
IMM GRANULOCYTES NFR BLD AUTO: 0.1 %
LYMPHOCYTES # BLD AUTO: 2.37 K/UL
LYMPHOCYTES NFR BLD AUTO: 32 %
MAN DIFF?: NORMAL
MCHC RBC-ENTMCNC: 30.5 PG
MCHC RBC-ENTMCNC: 32.3 GM/DL
MCV RBC AUTO: 94.5 FL
MONOCYTES # BLD AUTO: 0.62 K/UL
MONOCYTES NFR BLD AUTO: 8.4 %
NEUTROPHILS # BLD AUTO: 4.15 K/UL
NEUTROPHILS NFR BLD AUTO: 56.2 %
PLATELET # BLD AUTO: 285 K/UL
POTASSIUM SERPL-SCNC: 4.9 MMOL/L
PROT SERPL-MCNC: 7.1 G/DL
PSA SERPL-MCNC: 2.04 NG/ML
RBC # BLD: 5.05 M/UL
RBC # FLD: 13.1 %
SODIUM SERPL-SCNC: 143 MMOL/L
T3FREE SERPL-MCNC: 3.11 PG/ML
T4 FREE SERPL-MCNC: 1.6 NG/DL
TSH SERPL-ACNC: 1.41 UIU/ML
WBC # FLD AUTO: 7.4 K/UL

## 2019-08-16 RX ORDER — ISOPROPYL ALCOHOL 70 ML/100ML
SWAB TOPICAL
Qty: 1 | Refills: 0 | Status: ACTIVE | COMMUNITY
Start: 2019-08-16 | End: 1900-01-01

## 2019-09-04 LAB
TESTOST BND SERPL-MCNC: 6.8 PG/ML
TESTOST SERPL-MCNC: 318.8 NG/DL

## 2019-09-14 PROBLEM — R74.8 ELEVATED LIVER ENZYMES: Status: ACTIVE | Noted: 2019-08-14

## 2019-09-14 NOTE — HISTORY OF PRESENT ILLNESS
[FreeTextEntry1] : Mr. Zepeda  is 44 year old male here for low testosterone.  Patient reports gaining 125 lbs 10 years  ago and has been having difficulty losing the weight. He reports unable to exercise  due fainting episodes.  He has chronic fatigue syndrome for the last two years. Patient also rash, mild hair loss.\par PSA 2.95 and  one liver enzyme slightly up in past. in the past. history of prediabetes.\par His main concern today is to restart testosterone. \par Testosterone 254 from afternoon testost level in June\par ALT 44 in april.\par Feeling weaker and fatigued.  Follows with [facility] drJeanette and neurologist.\par is on haldol, adderall , cfs supplement.\par Psychiatrit  FELI Ahumada.\par Urologist Dr. Mitchell

## 2019-09-14 NOTE — PHYSICAL EXAM
[Alert] : alert [Well Nourished] : well nourished [No Acute Distress] : no acute distress [Well Developed] : well developed [EOMI] : extra ocular movement intact [Normal Sclera/Conjunctiva] : normal sclera/conjunctiva [No Proptosis] : no proptosis [Normal Oropharynx] : the oropharynx was normal [Thyroid Not Enlarged] : the thyroid was not enlarged [No Thyroid Nodules] : there were no palpable thyroid nodules [No Respiratory Distress] : no respiratory distress [Clear to Auscultation] : lungs were clear to auscultation bilaterally [No Accessory Muscle Use] : no accessory muscle use [Normal Rate] : heart rate was normal  [Normal S1, S2] : normal S1 and S2 [Regular Rhythm] : with a regular rhythm [Pedal Pulses Normal] : the pedal pulses are present [No Edema] : there was no peripheral edema [Not Tender] : non-tender [Normal Bowel Sounds] : normal bowel sounds [Not Distended] : not distended [Soft] : abdomen soft [Anterior Cervical Nodes] : anterior cervical nodes [Post Cervical Nodes] : posterior cervical nodes [Axillary Nodes] : axillary nodes [Normal] : normal and non tender [No Spinal Tenderness] : no spinal tenderness [Spine Straight] : spine straight [No Stigmata of Cushings Syndrome] : no stigmata of cushings syndrome [Normal Strength/Tone] : muscle strength and tone were normal [Normal Gait] : normal gait [No Rash] : no rash [Oriented x3] : oriented to person, place, and time [Normal Reflexes] : deep tendon reflexes were 2+ and symmetric [No Tremors] : no tremors [Acanthosis Nigricans] : no acanthosis nigricans

## 2019-09-24 PROCEDURE — 99213 OFFICE O/P EST LOW 20 MIN: CPT

## 2019-10-04 ENCOUNTER — RX RENEWAL (OUTPATIENT)
Age: 44
End: 2019-10-04

## 2019-10-04 RX ORDER — SYRINGE WITH NEEDLE, 1 ML 25GX5/8"
21G X 1-1/2" SYRINGE, EMPTY DISPOSABLE MISCELLANEOUS
Qty: 2 | Refills: 2 | Status: ACTIVE | COMMUNITY
Start: 2019-08-16 | End: 1900-01-01

## 2019-10-04 NOTE — HISTORY OF PRESENT ILLNESS
[FreeTextEntry1] : Mr. Kaplan  is a 44 year old male here for low testosterone. Patient has loss three pounds  in the past several week. He attributes his weight loss to decreased stress.. Patient continues to have  rash to  chin and back and fatigue .He sees psychiatrist Dr Ahumada  every one to two months.Patient denies suicidal or homicidal ideations. Additional diagnoses includes that of chronic fatigue, obesity. \par taking aspartic acid zn mg selenium fenugreek to boost tstaw and herbs  feels much better  Want to take teststosterone\par on Tribulus  and  [place]  spine bark and oat straw\par I spoke with his urologist, Dr. Jose today   ok to go ahead with the testosterone and he will keep watch. re psa.\par he feels that the low testosterone is contributing to his fatigue and low energy.\par Want to use testosterone by injection\par On adermal for cfs\par Hx of gerd\par On ambien too\par

## 2019-10-04 NOTE — PHYSICAL EXAM
[Alert] : alert [No Acute Distress] : no acute distress [Well Nourished] : well nourished [Well Developed] : well developed [Normal Sclera/Conjunctiva] : normal sclera/conjunctiva [EOMI] : extra ocular movement intact [No Proptosis] : no proptosis [Normal Oropharynx] : the oropharynx was normal [Thyroid Not Enlarged] : the thyroid was not enlarged [No Thyroid Nodules] : there were no palpable thyroid nodules [No Respiratory Distress] : no respiratory distress [No Accessory Muscle Use] : no accessory muscle use [Clear to Auscultation] : lungs were clear to auscultation bilaterally [Normal Rate] : heart rate was normal  [Normal S1, S2] : normal S1 and S2 [Pedal Pulses Normal] : the pedal pulses are present [Regular Rhythm] : with a regular rhythm [No Edema] : there was no peripheral edema [Normal Bowel Sounds] : normal bowel sounds [Not Tender] : non-tender [Soft] : abdomen soft [Not Distended] : not distended [Post Cervical Nodes] : posterior cervical nodes [Anterior Cervical Nodes] : anterior cervical nodes [Axillary Nodes] : axillary nodes [Normal] : normal and non tender [No Spinal Tenderness] : no spinal tenderness [Spine Straight] : spine straight [No Stigmata of Cushings Syndrome] : no stigmata of cushings syndrome [Normal Gait] : normal gait [Normal Strength/Tone] : muscle strength and tone were normal [No Rash] : no rash [Normal Reflexes] : deep tendon reflexes were 2+ and symmetric [No Tremors] : no tremors [Oriented x3] : oriented to person, place, and time [Acanthosis Nigricans] : no acanthosis nigricans

## 2019-11-01 ENCOUNTER — RX RENEWAL (OUTPATIENT)
Age: 44
End: 2019-11-01

## 2019-11-04 ENCOUNTER — RX RENEWAL (OUTPATIENT)
Age: 44
End: 2019-11-04

## 2019-11-04 RX ORDER — MULTIVIT-MINS/IRON/FOLIC/LYCOP 8-200-600
70 TABLET ORAL
Qty: 120 | Refills: 0 | Status: ACTIVE | COMMUNITY
Start: 2019-11-04 | End: 1900-01-01

## 2019-11-08 ENCOUNTER — RX RENEWAL (OUTPATIENT)
Age: 44
End: 2019-11-08

## 2019-11-25 ENCOUNTER — RX RENEWAL (OUTPATIENT)
Age: 44
End: 2019-11-25

## 2019-12-09 ENCOUNTER — APPOINTMENT (OUTPATIENT)
Dept: ENDOCRINOLOGY | Facility: CLINIC | Age: 44
End: 2019-12-09

## 2020-01-13 ENCOUNTER — APPOINTMENT (OUTPATIENT)
Dept: ENDOCRINOLOGY | Facility: CLINIC | Age: 45
End: 2020-01-13

## 2020-03-18 ENCOUNTER — RX RENEWAL (OUTPATIENT)
Age: 45
End: 2020-03-18

## 2020-04-21 RX ORDER — SILDENAFIL 20 MG/1
20 TABLET ORAL
Qty: 20 | Refills: 0 | Status: DISCONTINUED | COMMUNITY
Start: 2019-06-21 | End: 2020-04-21

## 2020-05-19 ENCOUNTER — APPOINTMENT (OUTPATIENT)
Dept: ENDOCRINOLOGY | Facility: CLINIC | Age: 45
End: 2020-05-19
Payer: MEDICARE

## 2020-05-19 DIAGNOSIS — E66.9 OBESITY, UNSPECIFIED: ICD-10-CM

## 2020-05-19 PROCEDURE — 99214 OFFICE O/P EST MOD 30 MIN: CPT | Mod: 95

## 2020-05-20 NOTE — PHYSICAL EXAM
[Alert] : alert [Well Nourished] : well nourished [Healthy Appearance] : healthy appearance [No Acute Distress] : no acute distress [Well Developed] : well developed [Oriented x3] : oriented to person, place, and time [Normal Affect] : the affect was normal [Normal Insight/Judgement] : insight and judgment were intact [Normal Mood] : the mood was normal

## 2020-05-20 NOTE — HISTORY OF PRESENT ILLNESS
[Medical Office: ([facility])___] : at the medical office located in  [Home] : at home, [unfilled] , at the time of the visit. [Patient] : the patient [Self] : self [FreeTextEntry1] : Mr. Kaplan  is a 45 year old male who returns for endocrine follow up via telehealth.\par He does have significant concerns re ongoing erectile function difficulties.\par He does follow with psychiatrist Dr Ahumada  every one to two months.He does have underlying anxiety and depression Additional diagnoses includes that of chronic fatigue, obesity.  Chronic fatigue still very troublesome.\par \par He continues  on testosterone with the ok from his urologist Dr. Mitchell re his psa. Continues on testosterone every 10 days via injection.\par he feels that the low testosterone is contributing to his fatigue and low energy.\par Despite use alyssa testosterone, he still has significant troubles with obtaining erection. Prior use of phosphodiesterase inhibitors such as cialis di offer significant improvement.\par Hx of gerd, obesity, sleep apnea and nsomnia. \par remains on Haldol, benztropine,,along with Klonopin for anxiety\par He feels his inability to obtain erection has contributed greatly to his depression\par \par \par

## 2020-07-17 ENCOUNTER — APPOINTMENT (OUTPATIENT)
Dept: INTERNAL MEDICINE | Facility: CLINIC | Age: 45
End: 2020-07-17

## 2020-07-20 ENCOUNTER — APPOINTMENT (OUTPATIENT)
Dept: ENDOCRINOLOGY | Facility: CLINIC | Age: 45
End: 2020-07-20
Payer: MEDICARE

## 2020-07-20 PROCEDURE — 99214 OFFICE O/P EST MOD 30 MIN: CPT | Mod: 95

## 2020-07-23 ENCOUNTER — APPOINTMENT (OUTPATIENT)
Dept: INTERNAL MEDICINE | Facility: CLINIC | Age: 45
End: 2020-07-23
Payer: MEDICARE

## 2020-07-23 ENCOUNTER — APPOINTMENT (OUTPATIENT)
Dept: ENDOCRINOLOGY | Facility: CLINIC | Age: 45
End: 2020-07-23
Payer: MEDICARE

## 2020-07-23 VITALS
OXYGEN SATURATION: 96 % | TEMPERATURE: 98 F | WEIGHT: 300 LBS | HEART RATE: 112 BPM | BODY MASS INDEX: 38.5 KG/M2 | HEIGHT: 74 IN

## 2020-07-23 VITALS — DIASTOLIC BLOOD PRESSURE: 70 MMHG | SYSTOLIC BLOOD PRESSURE: 110 MMHG

## 2020-07-23 DIAGNOSIS — Z83.3 FAMILY HISTORY OF DIABETES MELLITUS: ICD-10-CM

## 2020-07-23 DIAGNOSIS — Z11.59 ENCOUNTER FOR SCREENING FOR OTHER VIRAL DISEASES: ICD-10-CM

## 2020-07-23 DIAGNOSIS — Z81.8 FAMILY HISTORY OF OTHER MENTAL AND BEHAVIORAL DISORDERS: ICD-10-CM

## 2020-07-23 PROCEDURE — 99215 OFFICE O/P EST HI 40 MIN: CPT | Mod: 95

## 2020-07-23 PROCEDURE — 36415 COLL VENOUS BLD VENIPUNCTURE: CPT

## 2020-07-23 PROCEDURE — 99203 OFFICE O/P NEW LOW 30 MIN: CPT | Mod: 25

## 2020-07-23 RX ORDER — ZOLPIDEM TARTRATE 12.5 MG/1
12.5 TABLET, EXTENDED RELEASE ORAL
Qty: 30 | Refills: 1 | Status: ACTIVE | COMMUNITY
Start: 2020-07-23

## 2020-07-23 RX ORDER — ZOLPIDEM TARTRATE 10 MG/1
10 TABLET, FILM COATED ORAL
Refills: 0 | Status: DISCONTINUED | COMMUNITY
End: 2020-07-23

## 2020-07-23 RX ORDER — AZITHROMYCIN 250 MG/1
250 TABLET, FILM COATED ORAL
Qty: 6 | Refills: 0 | Status: DISCONTINUED | COMMUNITY
Start: 2019-01-15 | End: 2020-07-23

## 2020-07-23 RX ORDER — DOXYCYCLINE HYCLATE 100 MG/1
100 CAPSULE ORAL
Qty: 14 | Refills: 0 | Status: DISCONTINUED | COMMUNITY
Start: 2019-01-22 | End: 2020-07-23

## 2020-07-23 RX ORDER — HALOPERIDOL 5 MG/1
5 TABLET ORAL TWICE DAILY
Refills: 0 | Status: ACTIVE | COMMUNITY

## 2020-07-23 RX ORDER — BENZTROPINE MESYLATE 1 MG/1
1 TABLET ORAL
Refills: 0 | Status: ACTIVE | COMMUNITY
Start: 2020-07-23

## 2020-07-23 RX ORDER — ALBUTEROL SULFATE 90 UG/1
108 (90 BASE) INHALANT RESPIRATORY (INHALATION) EVERY 4 HOURS
Qty: 1 | Refills: 2 | Status: DISCONTINUED | COMMUNITY
Start: 2019-01-15 | End: 2020-07-23

## 2020-07-23 RX ORDER — ALBUTEROL SULFATE 90 UG/1
108 (90 BASE) AEROSOL, METERED RESPIRATORY (INHALATION) EVERY 4 HOURS
Qty: 1 | Refills: 3 | Status: DISCONTINUED | COMMUNITY
Start: 2019-01-15 | End: 2020-07-23

## 2020-07-23 RX ORDER — CEFPODOXIME PROXETIL 200 MG/1
200 TABLET, FILM COATED ORAL
Qty: 14 | Refills: 0 | Status: DISCONTINUED | COMMUNITY
Start: 2019-01-15 | End: 2020-07-23

## 2020-07-23 RX ORDER — ESZOPICLONE 1 MG/1
1 TABLET, COATED ORAL
Refills: 0 | Status: DISCONTINUED | COMMUNITY
End: 2020-07-23

## 2020-07-23 RX ORDER — CLONAZEPAM 1 MG/1
1 TABLET ORAL
Qty: 30 | Refills: 0 | Status: ACTIVE | COMMUNITY

## 2020-07-23 NOTE — PHYSICAL EXAM
[No Acute Distress] : no acute distress [Well Nourished] : well nourished [Well Developed] : well developed [Well-Appearing] : well-appearing [Normal Sclera/Conjunctiva] : normal sclera/conjunctiva [PERRL] : pupils equal round and reactive to light [EOMI] : extraocular movements intact [Normal Outer Ear/Nose] : the outer ears and nose were normal in appearance [Normal Oropharynx] : the oropharynx was normal [No JVD] : no jugular venous distention [No Lymphadenopathy] : no lymphadenopathy [Supple] : supple [Thyroid Normal, No Nodules] : the thyroid was normal and there were no nodules present [No Respiratory Distress] : no respiratory distress  [No Accessory Muscle Use] : no accessory muscle use [Clear to Auscultation] : lungs were clear to auscultation bilaterally [Normal Rate] : normal rate  [Normal S1, S2] : normal S1 and S2 [Regular Rhythm] : with a regular rhythm [No Murmur] : no murmur heard [No Carotid Bruits] : no carotid bruits [No Abdominal Bruit] : a ~M bruit was not heard ~T in the abdomen [No Varicosities] : no varicosities [Pedal Pulses Present] : the pedal pulses are present [No Edema] : there was no peripheral edema [No Palpable Aorta] : no palpable aorta [No Extremity Clubbing/Cyanosis] : no extremity clubbing/cyanosis [Soft] : abdomen soft [Non Tender] : non-tender [Non-distended] : non-distended [No Masses] : no abdominal mass palpated [Normal Bowel Sounds] : normal bowel sounds [No HSM] : no HSM [Normal Anterior Cervical Nodes] : no anterior cervical lymphadenopathy [Normal Posterior Cervical Nodes] : no posterior cervical lymphadenopathy [No CVA Tenderness] : no CVA  tenderness [No Spinal Tenderness] : no spinal tenderness [No Joint Swelling] : no joint swelling [Grossly Normal Strength/Tone] : grossly normal strength/tone [No Rash] : no rash [Normal Gait] : normal gait [Coordination Grossly Intact] : coordination grossly intact [No Focal Deficits] : no focal deficits [Deep Tendon Reflexes (DTR)] : deep tendon reflexes were 2+ and symmetric [Normal Affect] : the affect was normal [Normal Insight/Judgement] : insight and judgment were intact [de-identified] : obese

## 2020-07-23 NOTE — HISTORY OF PRESENT ILLNESS
[FreeTextEntry8] : The patient is here for an initial visit.  He comes in mainly because he needs to establish care with a PCP because he needs referrals.  He specifically now needs a referral to see Dr. Didier Sullivan because he reports that he is prone to ear infections.\par \par He has a history of chronic fatigue syndrome which he says has "ruined his life".  He sees Dr. Didier Willis and Dr. Mayorga in [facility].  He also sees a psychiatrist, Dr. Tana Mills at [facility] for anxiety.  He has insomnia, ED, IRMA.\par \par He sees an endocrinologist for GIT and ED and he is on testosterone injections\par \par He doesn't exercise- he says he has too much fatigue.  He says his diet is good.\par \par He is single and lives with his mother.  He has no children.  He does not work- he is on disability.  He doesn't smoke or use drugs and he drinks ETOH socially.  His mother is a smoker.\par \par He has had a pneumonia and had seen a pulmonologist.

## 2020-07-23 NOTE — ASSESSMENT
[FreeTextEntry1] : Medical history reviewed.  He primarily needs an ENT referral which will be done.\par \par Discussed diet and exercise and he was urged to try to lose weight.\par \par WE reviewed the CFS and anxiety and updated the medications.\par \par He declines blood tests today but he does agree to a COVID-19 antibody.  \par \par He should come in for a CPE.

## 2020-07-24 DIAGNOSIS — H66.90 OTITIS MEDIA, UNSPECIFIED, UNSPECIFIED EAR: ICD-10-CM

## 2020-07-24 LAB
SARS-COV-2 IGG SERPL IA-ACNC: <3.8 AU/ML
SARS-COV-2 IGG SERPL QL IA: NEGATIVE

## 2020-08-09 NOTE — HISTORY OF PRESENT ILLNESS
[FreeTextEntry1] : Mr. Kaplan  is a 45 year old male who returns for endocrine follow up via telehealth. We tried, but were unable to successfully connect with [facility].\par He does follow with psychiatrist Dr Ahumada  every one to two months.He does have underlying anxiety and depression Additional diagnoses includes that of chronic fatigue, obesity.  Chronic fatigue still very troublesome.\par He is on testosterone now 200 mg  IM every 10 days\par he feels that the low testosterone is contributing to his fatigue and low energy.\par  Prior use of phosphodiesterase inhibitors such as Cialis do offer significant improvement.\par Hx of gerd, obesity, sleep apnea and insomnia. \par remains on Haldol, benztropine,,along with Klonopin for anxiety\par He feels his inability to obtain erection has contributed greatly to his depression\par Does use cpap for sleep apnea -too has insomnia\par \par He recently spoke with a cfs dr in [place]- Dr. Aby Jasmine in [place]-he had video conference and was advised take Joppa thyroid(even without the Dr. having evaluated his tft's) and high doses of vit c and too was told to follow her diet in her book and also was  told to increase vit d level. He too had been told to take selenium\par \par He states his cfs has been severe states he cant walk even 2 blocks -he efeesl very weak and like he will collapse.\par \par \par

## 2020-08-18 RX ORDER — TADALAFIL 10 MG/1
10 TABLET, FILM COATED ORAL
Qty: 10 | Refills: 0 | Status: DISCONTINUED | COMMUNITY
Start: 2020-04-21 | End: 2020-08-18

## 2020-08-28 RX ORDER — TADALAFIL 10 MG/1
10 TABLET, FILM COATED ORAL
Qty: 10 | Refills: 0 | Status: ACTIVE | COMMUNITY
Start: 2020-08-28 | End: 1900-01-01

## 2020-09-10 ENCOUNTER — APPOINTMENT (OUTPATIENT)
Dept: CARDIOLOGY | Facility: CLINIC | Age: 45
End: 2020-09-10

## 2020-09-17 ENCOUNTER — APPOINTMENT (OUTPATIENT)
Dept: ENDOCRINOLOGY | Facility: CLINIC | Age: 45
End: 2020-09-17
Payer: MEDICARE

## 2020-09-17 VITALS
DIASTOLIC BLOOD PRESSURE: 82 MMHG | HEIGHT: 74 IN | WEIGHT: 300 LBS | OXYGEN SATURATION: 97 % | SYSTOLIC BLOOD PRESSURE: 140 MMHG | RESPIRATION RATE: 16 BRPM | HEART RATE: 130 BPM | BODY MASS INDEX: 38.5 KG/M2

## 2020-09-17 VITALS — HEART RATE: 100 BPM

## 2020-09-17 DIAGNOSIS — F41.9 ANXIETY DISORDER, UNSPECIFIED: ICD-10-CM

## 2020-09-17 DIAGNOSIS — G47.00 INSOMNIA, UNSPECIFIED: ICD-10-CM

## 2020-09-17 DIAGNOSIS — R53.82 CHRONIC FATIGUE, UNSPECIFIED: ICD-10-CM

## 2020-09-17 PROCEDURE — 36415 COLL VENOUS BLD VENIPUNCTURE: CPT

## 2020-09-17 PROCEDURE — 99215 OFFICE O/P EST HI 40 MIN: CPT | Mod: 25

## 2020-09-17 NOTE — REVIEW OF SYSTEMS
[Fatigue] : fatigue [Recent Weight Gain (___ Lbs)] : recent weight gain: [unfilled] lbs [Negative] : Heme/Lymph

## 2020-09-28 DIAGNOSIS — H93.8X9 OTHER SPECIFIED DISORDERS OF EAR, UNSPECIFIED EAR: ICD-10-CM

## 2020-10-01 PROBLEM — R53.82 CHRONIC FATIGUE: Status: ACTIVE | Noted: 2019-07-10

## 2020-10-01 PROBLEM — F41.9 ANXIETY: Status: ACTIVE | Noted: 2020-07-23

## 2020-10-01 PROBLEM — G47.00 INSOMNIA: Status: ACTIVE | Noted: 2020-07-23

## 2020-10-01 NOTE — HISTORY OF PRESENT ILLNESS
[FreeTextEntry1] : Mr Lenz is a 45 year old  male  who presents today with history of chronic fatigue and sleep deprivation. Patient  reports has not slept in 10 days and is very irritable. Patient did mention thoughts of suicide, however reports not new. Denies wanting to hurts self  or others and states " you are over reacting"  . Denies having a plan to harm self or others. . Patient has an appointment with psychiatrist  Dr Alverto Clark at [facility] tomorrow. As per patient , MD is aware of thoughts.  Patient reports lives with parents and they are aware of his present condition. He reports sleep deprivation happens every three months.

## 2020-10-08 LAB
25(OH)D3 SERPL-MCNC: 88.3 NG/ML
ALBUMIN SERPL ELPH-MCNC: 4.6 G/DL
ALP BLD-CCNC: 86 U/L
ALT SERPL-CCNC: 36 U/L
ANION GAP SERPL CALC-SCNC: 13 MMOL/L
AST SERPL-CCNC: 32 U/L
BASOPHILS # BLD AUTO: 0.05 K/UL
BASOPHILS NFR BLD AUTO: 0.7 %
BILIRUB SERPL-MCNC: 0.4 MG/DL
BUN SERPL-MCNC: 16 MG/DL
CALCIUM SERPL-MCNC: 10.4 MG/DL
CHLORIDE SERPL-SCNC: 100 MMOL/L
CHOLEST SERPL-MCNC: 294 MG/DL
CHOLEST/HDLC SERPL: 6.5 RATIO
CO2 SERPL-SCNC: 27 MMOL/L
CORTICOSTEROID BIND GLOBULIN: 2.9 MG/DL
CORTIS SERPL-MCNC: 12.9 UG/DL
CORTIS SERPL-MCNC: 13 UG/DL
CORTISOL, FREE: 0.59 UG/DL
CREAT SERPL-MCNC: 1.01 MG/DL
DHEA-SULFATE, SERUM: 389 UG/DL
EOSINOPHIL # BLD AUTO: 0.26 K/UL
EOSINOPHIL NFR BLD AUTO: 3.8 %
ESTIMATED AVERAGE GLUCOSE: 120 MG/DL
FERRITIN SERPL-MCNC: 90 NG/ML
FOLATE SERPL-MCNC: >20 NG/ML
FSH SERPL-MCNC: 1.2 IU/L
FSH: 1.4 MIU/ML
GLUCOSE SERPL-MCNC: 99 MG/DL
HBA1C MFR BLD HPLC: 5.8 %
HCT VFR BLD CALC: 53.8 %
HDLC SERPL-MCNC: 45 MG/DL
HGB BLD-MCNC: 17.4 G/DL
IMM GRANULOCYTES NFR BLD AUTO: 0.3 %
IRON SERPL-MCNC: 145 UG/DL
LDLC SERPL CALC-MCNC: 189 MG/DL
LH SERPL-ACNC: 2 IU/L
LYMPHOCYTES # BLD AUTO: 2.01 K/UL
LYMPHOCYTES NFR BLD AUTO: 29.2 %
MAN DIFF?: NORMAL
MCHC RBC-ENTMCNC: 29.7 PG
MCHC RBC-ENTMCNC: 32.3 GM/DL
MCV RBC AUTO: 92 FL
MONOCYTES # BLD AUTO: 0.51 K/UL
MONOCYTES NFR BLD AUTO: 7.4 %
NEUTROPHILS # BLD AUTO: 4.03 K/UL
NEUTROPHILS NFR BLD AUTO: 58.6 %
PFCX: 4.6 %
PLATELET # BLD AUTO: 252 K/UL
POTASSIUM SERPL-SCNC: 4.6 MMOL/L
PROLACTIN SERPL-MCNC: 16 NG/ML
PROT SERPL-MCNC: 7.6 G/DL
PSA SERPL-MCNC: 2.78 NG/ML
RBC # BLD: 5.85 M/UL
RBC # FLD: 12.9 %
SHBG-ESOTERIX: 13.2 NMOL/L
SODIUM SERPL-SCNC: 140 MMOL/L
T3FREE SERPL-MCNC: 2.98 PG/ML
T3REVERSE SERPL-MCNC: 21.8 NG/DL
T4 FREE SERPL-MCNC: 1.5 NG/DL
TESTOST BND SERPL-MCNC: 8.8 PG/ML
TESTOST SERPL-MCNC: 130.2 NG/DL
THYROGLOB AB SERPL-ACNC: <20 IU/ML
THYROPEROXIDASE AB SERPL IA-ACNC: <10 IU/ML
TRIGL SERPL-MCNC: 300 MG/DL
TSH RECEPTOR AB: <1.1 IU/L
TSH SERPL-ACNC: 0.69 UIU/ML
TSI ACT/NOR SER: <0.1 IU/L
VIT B12 SERPL-MCNC: >2000 PG/ML
WBC # FLD AUTO: 6.88 K/UL

## 2020-12-23 PROBLEM — H66.90 EAR INFECTION: Status: RESOLVED | Noted: 2020-07-24 | Resolved: 2020-12-23

## 2021-01-21 ENCOUNTER — EMERGENCY (EMERGENCY)
Facility: HOSPITAL | Age: 46
LOS: 1 days | Discharge: HOME | End: 2021-01-21
Admitting: EMERGENCY MEDICINE
Payer: MEDICARE

## 2021-01-21 VITALS
HEIGHT: 72 IN | TEMPERATURE: 98 F | DIASTOLIC BLOOD PRESSURE: 86 MMHG | RESPIRATION RATE: 19 BRPM | HEART RATE: 90 BPM | OXYGEN SATURATION: 99 % | SYSTOLIC BLOOD PRESSURE: 146 MMHG

## 2021-01-21 DIAGNOSIS — F84.0 AUTISTIC DISORDER: ICD-10-CM

## 2021-01-21 DIAGNOSIS — F25.0 SCHIZOAFFECTIVE DISORDER, BIPOLAR TYPE: ICD-10-CM

## 2021-01-21 LAB
APPEARANCE UR: CLEAR — SIGNIFICANT CHANGE UP
BILIRUB UR-MCNC: NEGATIVE — SIGNIFICANT CHANGE UP
COLOR SPEC: YELLOW — SIGNIFICANT CHANGE UP
DIFF PNL FLD: NEGATIVE — SIGNIFICANT CHANGE UP
GLUCOSE UR QL: NEGATIVE — SIGNIFICANT CHANGE UP
KETONES UR-MCNC: ABNORMAL
LEUKOCYTE ESTERASE UR-ACNC: NEGATIVE — SIGNIFICANT CHANGE UP
NITRITE UR-MCNC: NEGATIVE — SIGNIFICANT CHANGE UP
PCP SPEC-MCNC: SIGNIFICANT CHANGE UP
PH UR: 6 — SIGNIFICANT CHANGE UP (ref 5–8)
PROT UR-MCNC: ABNORMAL
SP GR SPEC: 1.03 — HIGH (ref 1.01–1.02)
UROBILINOGEN FLD QL: SIGNIFICANT CHANGE UP

## 2021-01-21 PROCEDURE — 90792 PSYCH DIAG EVAL W/MED SRVCS: CPT

## 2021-01-21 PROCEDURE — 99284 EMERGENCY DEPT VISIT MOD MDM: CPT

## 2021-01-21 NOTE — ED BEHAVIORAL HEALTH ASSESSMENT NOTE - DETAILS
sister- MDD suicide attempt in past by stabbing self in 2013 Dr. Ahumada agrees Dr Goldman written safety plan completed outpatient treatment team; sister outpatient treatment team; sister/brother written safety plan completed; see formulation

## 2021-01-21 NOTE — ED ADULT NURSE NOTE - NSHOSCREENINGQ1_ED_ALL_ED
Problem: Mobility Impaired (Adult and Pediatric) Goal: *Acute Goals and Plan of Care (Insert Text) Physical Therapy Goals Initiated 7/30/2018 1. Patient will move from supine to sit and sit to supine , scoot up and down and roll side to side in bed with minimal assistance/contact guard assist x 1 within 7 day(s). 2.  Patient will transfer from bed to chair and chair to bed with supervision/set-up using the least restrictive device within 7 day(s). 3.  Patient will perform sit to stand with supervision/set-up within 7 day(s). 4.  Patient will ambulate with minimal assistance/contact guard assist for 50 feet with the least restrictive device within 7 day(s). physical Therapy TREATMENT Patient: Amarjit Khoury (89 y.o. female) Date: 8/1/2018 Diagnosis: Atrial flutter with rapid ventricular response (HCC) <principal problem not specified> Precautions: Fall Chart, physical therapy assessment, plan of care and goals were reviewed. ASSESSMENT: 
Pt cleared by nurse to mobilize. Pt received in bed supine. Pt agreeable to getting up to chair. Pt performed supine to sit at min A with cueing for sequencing. Pt performed x2 sit to stand tranfers at min A with cueing for hand placement. Pt able to take steps over to chair 3 ft with RW at min A/CGA. Once sitting in chair pt reported feeling dizzy and as if she was going to pass out. Pts BP at 64/44 with HR of 76BPM. Pt immediately returned to bed supine. Pt able to take steps 3ft back to bed with RW at min A/CGA. Pts BP improved to 94/55 with HR of 83BPM. Pt with improved mobility with RW. Pts mobility limited by decreased in BP. Pt would benefit from SNF rehab to improve strength and safe mobility. Progression toward goals: 
[]    Improving appropriately and progressing toward goals [x]    Improving slowly and progressing toward goals 
[]    Not making progress toward goals and plan of care will be adjusted PLAN: 
Patient continues to benefit from skilled intervention to address the above impairments. Continue treatment per established plan of care. Discharge Recommendations:  Justin Avila Further Equipment Recommendations for Discharge:  TBD by rehab SUBJECTIVE:  
Patient stated I need my shoes.  OBJECTIVE DATA SUMMARY:  
Critical Behavior: 
Neurologic State: Alert, Confused Orientation Level: Oriented to person Cognition: Decreased attention/concentration, Decreased command following, Impaired decision making, Impulsive Functional Mobility Training: 
Bed Mobility: 
Rolling: Minimum assistance Supine to Sit: Minimum assistance Sit to Supine: Minimum assistance Transfers: 
Sit to Stand: Minimum assistance Stand to Sit: Contact guard assistance Bed to Chair: Minimum assistance;Contact guard assistance Balance: 
Sitting: Intact Standing: Impaired; Without support Standing - Static: Good;Constant support Standing - Dynamic : Fair Ambulation/Gait Training: 
Distance (ft): 6 Feet (ft) Assistive Device: Gait belt;Walker, rolling Ambulation - Level of Assistance: Minimal assistance;Contact guard assistance Gait Abnormalities: Decreased step clearance; Step to gait Base of Support: Narrowed Speed/Mindi: Slow Step Length: Right shortened;Left shortened Pain: Pt with no complaints of pain. Activity Tolerance: Pt with improved mobility with RW. After treatment: sitter in place 
[]    Patient left in no apparent distress sitting up in chair 
[x]    Patient left in no apparent distress in bed 
[x]    Call bell left within reach [x]    Nursing notified 
[]    Caregiver present [x]    Bed alarm activated COMMUNICATION/COLLABORATION:  
The patients plan of care was discussed with: Registered Nurse Ana Gates Time Calculation: 23 mins No

## 2021-01-21 NOTE — ED BEHAVIORAL HEALTH ASSESSMENT NOTE - NSSUICPROTFACT_PSY_ALL_CORE
Responsibility to children, family, or others/Identifies reasons for living/Supportive social network of family or friends/Cultural, spiritual and/or moral attitudes against suicide/Positive therapeutic relationships/Other Responsibility to children, family, or others/Identifies reasons for living/Supportive social network of family or friends/Cultural, spiritual and/or moral attitudes against suicide/Positive therapeutic relationships/Frustration tolerance/Sabianist beliefs/Other

## 2021-01-21 NOTE — ED BEHAVIORAL HEALTH ASSESSMENT NOTE - SUMMARY
46 y/o single white male, domiciled with parents, no dependents, disabled, history of Schizoaffective Disorder- Bipolar Type, Autism Spectrum Disorder by hx, in outpatient psychiatric treatment at [facility] and on APT, multiple past psychiatric admissions- last at [facility] 1/2-2/27 2020, history of suicide attempt in 2013 (stabbed self in chest), no violence hx, no legal problems. Per clinic history of misusing Adderall. Patient reports he is prescribed this medication ([facility]  verifies this), no other drug history, no ETOH, no DTs, PMH of sleep apnea & chronic fatigue syndrome, BIB EMS from outpatient clinic for suicidal ideation. 44 y/o single white male, domiciled with parents, no dependents, disabled, history of Schizoaffective Disorder- Bipolar Type, Autism Spectrum Disorder by hx, in outpatient psychiatric treatment at [facility] and on APT, multiple past psychiatric admissions- last at [facility] 1/2-2/27 2020, history of suicide attempt in 2013 (stabbed self in chest), no violence hx, no legal problems. Per clinic history of misusing Adderall. Patient reports he is prescribed this medication ([place]  verifies this), no other drug history, no ETOH, no DTs, PMH of sleep apnea & chronic fatigue syndrome, BIB EMS from outpatient clinic for suicidal ideation.    Patient presents to the ED in the context of suicidal ideation. Patient reportedly endorsed suicidal ideation to therapist after psychiatrist refused to give him antidepressant due to concerns of his psychiatric history (risk of alesia). Patient reports he has been feeling depressed related to weather and current isolation from pandemic but denies endorsing suicidal ideation stating his words were "misinterpreted." Patient is able to safety plan. Discussed with sister who agrees patient is depressed and has insinuated some passive suicidal comments but denies patient endorsing active SI/plan/intent. She reports he has a history of making these sorts of statements and has no safety concerns. Reviewed  and patient is using adderall which is prescribed by internist which he stated is for chronic fatigue syndrome and is also having triazolam prescribed by sleep specialist. Patient presents pressured and grandiose at times. However he does not appear psychotic. Per family patient always presents this way and his current presentation may also be related to ASD diagnosis and adderall use. He denies and does not appear acutely psychotic or manic. He is functioning caring for self and parents despite episodes of depression. He adamantly denies SI/HI/SIB/intent/plan and is able to safety plan. He is future oriented discussing plans to celebrate with parents this week over their recent COVID vaccination and names multiple protective factors/supportive people in his life such as his art, sister, brother and future plans to make his own movie. He is compliant with treatment.     Patient was offered and refused inpatient psychiatric admission. He does not meet criteria for involuntary inpatient admission. Recommend discharge home and f/u with psychiatrist as scheduled 1/26/20. Extensive safety planning performed with patient and family. In addition to extensive discussion of safe places patient can go to, distraction techniques, coping skills, motivational interviewing and who patient can call in the event of crisis including various hotlines. Patient and family agreeing verbally to return patient to ER or call 911 if symptoms worsen or patient has urges to harm self or others. 46 y/o single white male, domiciled with parents, no dependents, disabled, history of Schizoaffective Disorder- Bipolar Type, Autism Spectrum Disorder by hx, in outpatient psychiatric treatment at [facility] and on AOT, multiple past psychiatric admissions- last at [facility] 1/4-2/27 2020, history of suicide attempt in 2013 (stabbed self in chest), no violence hx, no legal problems. Per clinic -history of misusing Adderall. Patient reports he is prescribed this medication ([facility]  verifies this), no other drug history, no ETOH, no DTs, PMH of sleep apnea & chronic fatigue syndrome, BIB EMS called by outpatient clinic for suicidal ideation.     Patient presents to the ED in the context of suicidal ideation. Patient reportedly endorsed suicidal ideation to therapist after psychiatrist refused to give him antidepressant due to concerns of his psychiatric history (risk of alesia). Patient reports he has been feeling depressed related to weather and current isolation from pandemic but denies endorsing suicidal ideation stating his words were "misinterpreted." Patient is able to safety plan. Discussed with sister/brother who agrees patient is depressed and has made some passive suicidal comments but denies patient endorsing active SI/plan/intent. Sister reports he has a history of making these sorts of statements chronically and brother/sister have no safety concerns and do not want patient hospitalized. Reviewed  and patient is using adderall which is prescribed by internist which he stated is for chronic fatigue syndrome and is also having triazolam prescribed by sleep specialist. Patient presents pressured and grandiose at times. However he does not appear psychotic. Per family patient always presents this way and his current presentation may also be related to ASD diagnosis vs. personality vs. adderall use. He denies and does not appear acutely psychotic or manic. He is functioning ,caring for self and parents despite episodes of depression. He adamantly denies SI/HI/SIB/intent/plan and is able to safety plan. He is future oriented discussing plans to celebrate with parents this week over their recent COVID vaccination and names multiple protective factors/supportive people in his life such as his art, sister, brother and future plans to make his own movie. He is compliant with treatment.     Patient was offered and refused inpatient psychiatric admission. He does not meet criteria for involuntary inpatient admission. Recommend discharge home and f/u with psychiatrist as scheduled 1/26/20. Extensive safety planning performed with patient and family. In addition to extensive discussion of safe places patient can go to, distraction techniques, coping skills, motivational interviewing and who patient can call in the event of crisis including various hotlines. Patient and family agreeing verbally to return patient to ER or call 911 if symptoms worsen or patient has urges to harm self or others. Family agreed to remove sharp objects and loose pills from home.

## 2021-01-21 NOTE — BH SAFETY PLAN - STEP 6 SAFE ENVIRONMENT
CTH completed without complication. Traveled with 1 RN, 1 RT, and 1 PT. Traveled via bed on continuous cardiac monitor with IV pole and propofol and levo gtt infusing. Pt returned to room safely. Bed locked and in lowest position. Alarms audible and active. VSS. Will continue to monitor.    n/a - feels environment is safe

## 2021-01-21 NOTE — ED ADULT TRIAGE NOTE - CHIEF COMPLAINT QUOTE
Patient brought to ER by EMS from home for EDP. He is Schizophrenic and told the [employer] Hotline that he wanted to kill himself. .

## 2021-01-21 NOTE — ED ADULT NURSE NOTE - CHIEF COMPLAINT QUOTE
Patient brought to ER by EMS from home for EDP. He is Schizophrenic and told the [employer] Hotline that he wanted to kill himself. .

## 2021-01-21 NOTE — ED BEHAVIORAL HEALTH ASSESSMENT NOTE - OTHER PAST PSYCHIATRIC HISTORY (INCLUDE DETAILS REGARDING ONSET, COURSE OF ILLNESS, INPATIENT/OUTPATIENT TREATMENT)
History of Schizoaffective Disorder- Bipolar Type, Autism Spectrum Disorder by hx, in outpatient psychiatric treatment at [facility] and on APT, multiple past psychiatric admissions- last at [facility] 1/2-2/27 2020, history of suicide attempt in 2013 (stabbed self in chest),

## 2021-01-21 NOTE — ED BEHAVIORAL HEALTH ASSESSMENT NOTE - DIFFERENTIAL
r/o bipolar disorder r/o substance induced mood disorder  r/o narcissistic personality   r/o bipolar disorder

## 2021-01-21 NOTE — ED PROVIDER NOTE - PROGRESS NOTE DETAILS
Patient cleared by psych, nontoxic and medically stable for discharge. Return precautions provided and patient understands to return to the ED for concerning or worsening signs and symptoms. Instructed to follow up with primary care physician and [facility] crisis center and agreeable. Patient's questions answered.

## 2021-01-21 NOTE — ED BEHAVIORAL HEALTH ASSESSMENT NOTE - CURRENT MEDICATION
Patient is prescribed 200mg Haldol Dec IM 1/20/21 (received injection), Cogentin 2mg BID, Klonopin 1mg BID, Klonopin 1mg PRN QD, Haldol 5mg BID,    Adderall 30mg ER QD, Adderall 30mg TID, triazolam 0.25 mg QHS Patient is prescribed 200mg Haldol Dec IM 1/20/21 (received injection), Cogentin 2mg BID, Klonopin 1mg BID, Klonopin 1mg PRN QD, Haldol 5mg BID,    Adderall 30mg ER QD (picked up 1/7/20), Adderall 30mg TID (recently picked up 1/8/20), triazolam 0.25 mg QHS

## 2021-01-21 NOTE — ED ADULT NURSE REASSESSMENT NOTE - NS ED NURSE REASSESS COMMENT FT1
Pt cleared for discharge to home. Pt remained in good behavioral control.  Pt provided with discharge instructions and left via [employer].

## 2021-01-21 NOTE — ED PROVIDER NOTE - NSFOLLOWUPINSTRUCTIONS_ED_ALL_ED_FT
Follow up with your primary care physician and psychiatrist in 48-72 hours.  You may also see the psychiatrist at [facility]:    [id number] [place] [address]  Phone: [phone]      SEEK IMMEDIATE MEDICAL CARE IF YOU HAVE ANY OF THE FOLLOWING SYMPTOMS: thoughts about hurting or killing yourself, thoughts about hurting or killing somebody else, hallucinations or any other worsening or persistent symptoms OR ANY NEW OR CONCERNING SYMPTOMS.

## 2021-01-21 NOTE — ED BEHAVIORAL HEALTH ASSESSMENT NOTE - DESCRIPTION
multiple degrees from [employer] [facility], disabled, lives and cares for elderly parents During course of ED visit patient was calm and cooperative. Patient was not aggressive or violent and did not require PRN medications.    Vital Signs Last 24 Hrs  T(C): 36.7 (21 Jan 2021 15:46), Max: 36.7 (21 Jan 2021 15:46)  T(F): 98 (21 Jan 2021 15:46), Max: 98 (21 Jan 2021 15:46)  HR: 90 (21 Jan 2021 15:46) (90 - 90)  BP: 146/86 (21 Jan 2021 15:46) (146/86 - 146/86)  BP(mean): --  RR: 19 (21 Jan 2021 15:46) (19 - 19)  SpO2: 99% (21 Jan 2021 15:46) (99% - 99%) sleep apnea, chronic fatigue syndrome

## 2021-01-21 NOTE — ED BEHAVIORAL HEALTH ASSESSMENT NOTE - RISK ASSESSMENT
Risk factors include history of suicide attempt, hospitalization, severe mental illness, medication noncompliance, suicidal statements. This patient is at risk for harm and requires inpatient level of care for safety and stabilization. Protective factors- no SI/HI/SIB/intent/plan, able  to safety plan, future oriented, treatment seeking, medication compliant, no alesia, not psychotic, able to function, caring for self/parents, no aggression/violence, legal issues, supportive family, outpatient treaters and cooperative during evaluation.     risk factors- periodic depression, suspected substance use, hx of inpatient admissions, hx of suicide attempt, chronic mental illness Low Acute Suicide Risk Protective factors- no SI/HI/SIB/intent/plan, able  to safety plan, future oriented, treatment seeking, medication compliant, no alesia, not psychotic, able to function, caring for self/parents, no aggression/violence, legal issues, supportive family, outpatient treaters and cooperative during evaluation.     risk factors- periodic depression, suspected substance misuse, hx of inpatient admissions, hx of suicide attempt, chronic mental illness

## 2021-01-21 NOTE — ED BEHAVIORAL HEALTH NOTE - BEHAVIORAL HEALTH NOTE
Spoke with ABBEY Brewer- She is the  of [facility]. Patient told his therapist he was having thoughts of suicide and was feeling very depressed and could not   commit to safety. They spoke with Dr. Tiffany Romero and she said with her conversation with him patient was pressured and kept asking her for an antidepressant. She thinks patient may be abusing adderall and felt he needs to be evaluated in the ED. Patient has a hx of abusing adderall. Spoke with ABBEY Rawlset Daniella ([phone])- She is the  of [facility]. Patient told his therapist he was having thoughts of suicide and was feeling very depressed and could not  commit to safety. They spoke with Dr. Tiffany Romero and she said with her conversation with him patient was pressured and kept asking her for an antidepressant. She thinks patient may be abusing adderall and felt he needs to be evaluated in the ED. Patient has a hx of abusing adderall. Patient is on AOT and has been complaint with his Haldol Deconate shot which he got yesterday. Rhona is diagnosed with Bipolar with psychotic features. He lives with his parents in [place].     Called Sister Adri ([phone])- She reports they have a security camera because patient's mother has Alzheimer's in the house. She saw on the camera patient was taken from the house by police. On the camera she heard that the doctors called the police to come get him. Patient has had schizophrenia x 12 years. She was speaking to patient the other day and "he is a little suicidal right now." She stated she does not think patient will harm herself. She stated she doesn't think his current medication regimen is helping him. Patient used to go to [facility] but his last episode he went to [facility]. She reports patent is depressed right now and she believes his medication regimen is making him feel depressed. He also has no friends and is overweight from the medication and she believes this is why the patient is depressed.     She also has depression and her psychiatrist agrees that the patient isn't on the right medication. Patient is smart and went to [facility]. She stated she does not think patient needs to be hospitalized but needs different medication.     Patient takes Haldol Inj 50mg IM and Klonopin. He does not take oral medication regularly.     Reviewed Psyckes- On AOT until 2/27/21.  Hx of [facility] [place] 3/10-5/28 2020. Patient was last hospitalized at [facility] 1/4-2/27 2020 Spoke with ABBEY Brewer ([phone])- She is the  of [facility]. Patient told his therapist he was having thoughts of suicide and was feeling very depressed and could not  commit to safety. They spoke with Dr. Tiffany Romero and she said with her conversation with him patient was pressured and kept asking her for an antidepressant. She thinks patient may be abusing adderall and felt he needs to be evaluated in the ED. Patient has a hx of abusing adderall. Patient is on AOT and has been complaint with his Haldol Deconate shot which he got yesterday. Patient is diagnosed with Bipolar with psychotic features. He lives with his parents in [place].     Called Sister Adri ([phone])- She reports they have a security camera because patient's mother has Alzheimer's in the house. She saw on the camera patient was taken from the house by police. On the camera she heard that the doctors called the police to come get him. Patient has had schizophrenia x 12 years. She was speaking to patient the other day and "he is feeling depressed and he stated there are times he thinks about dying. He did not endorse a suicide plan or intent. She reports patient always is depressed and will make these sort of statements. She stated she does not think patient will harm herself and she does not think patient needs to be hospitalized. However she stated she doesn't think his current medication regimen is helping him because he is overweight and only on Haldol. Patient used to go to [facility] but his last episode he went to [facility]. She reports patent is depressed right now and she believes his medication regimen is making him feel depressed. He also has no friends and is overweight from the medication and she believes this is why the patient is depressed. 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 She described patient as "a beautiful mind." The patient is his parents primary care taker- cooks, cleans, buys them food.     She also has depression and her psychiatrist agrees that the patient isn't on the right medication. Patient is smart and went to [facility]. She stated she does not think patient needs to be hospitalized but needs different medication.     Patient takes Haldol Inj 50mg IM and Klonopin. He does not take oral medication regularly.     Reviewed Psyckes- On AOT until 2/27/21.  Hx of [facility] 3/10-5/28 2020. Patient was last hospitalized at [facility] 1/4-2/27 2020. Spoke with ABBEY Brewer ([phone])- She is the  of [facility]. Patient told his therapist he was having thoughts of suicide and was feeling very depressed and could not  commit to safety. They spoke with Dr. Tiffany Romero and she said with her conversation with him patient was pressured and kept asking her for an antidepressant. She thinks patient may be abusing adderall and felt he needs to be evaluated in the ED. Patient has a hx of abusing adderall. Patient is on AOT and has been complaint with his Haldol Deconate shot which he got yesterday. Patient is diagnosed with Schizoaffective D/O Bipolar, Autism Spectrum (by history). He lives with his parents in [place].     Patient is prescribed 200mg Haldol Dec IM 1/20/21 (received injection), Cogentin 2mg BID, Klonopin 1mg BID, Klonopin 1mg PRN QD, Haldol 5mg BID    Per notes by therapist patient endorsed suicidal ideation but did not endorse plan. He would not participate in safety planning. He stated demonic attacks were out of the control. He was pleading with the psychiatrist to give him valium and an antidepressant. Psychiatrist had this discussion with patient prior to therapy session.     Called Sister Adri ([phone])- She reports they have a security camera because patient's mother has Alzheimer's in the house. She saw on the camera patient was taken from the house by police. On the camera she heard that the doctors called the police to come get him. Patient has had schizophrenia x 12 years. She was speaking to patient the other day and "he is feeling depressed and he stated there are times he thinks about dying. He did not endorse a suicide plan or intent. She reports patient always is depressed and will make these sort of statements. She stated she does not think patient will harm herself and she does not think patient needs to be hospitalized. However she stated she doesn't think his current medication regimen is helping him because he is overweight and only on Haldol. Patient used to go to [facility] but his last episode he went to [facility]. She reports patent is depressed right now and she believes his medication regimen is making him feel depressed. He also has no friends and is overweight from the medication and she believes this is why the patient is depressed. 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 She described patient as "a beautiful mind." The patient is his parents primary care taker- cooks, cleans, buys them food.     She also has depression and her psychiatrist agrees that the patient isn't on the right medication. Patient is smart and went to [facility]. She stated she does not think patient needs to be hospitalized but needs different medication.     Patient takes Haldol Inj 50mg IM and Klonopin. He does not take oral medication regularly.     Reviewed Psyckes- On AOT until 2/27/21.  Hx of [facility] [place] 3/10-5/28 2020. Patient was last hospitalized at [facility] 1/4-2/27 2020.    [facility]  Reviewed Reference #: [id number]      08/28/2020	01/08/2021	dextroamp-amphetamin 30 mg tab	90	30	Xin Mayorga DO	Insurance	[facility]  01/04/2021	01/07/2021	dextroamphetamine er 15 mg cap	120	30	Xin Mayorga DO	Medicare	[id number]  12/30/2020	01/04/2021	triazolam 0.25 mg tablet	30	15	Santi Sebastian MD	[id number]  12/23/2020	12/29/2020	clonazepam 1 mg tablet	60	30	Alverto Clark MD	[facility] Spoke with ABBEY Brewer ([phone])- She is the  of [facility]. Patient told his therapist he was having thoughts of suicide and was feeling very depressed and could not  commit to safety. They spoke with Dr. Tiffany Romero and she said with her conversation with him patient was pressured and kept asking her for an antidepressant. She thinks patient may be abusing adderall and felt he needs to be evaluated in the ED. Patient has a hx of abusing adderall. Patient is on AOT and has been complaint with his Haldol Deconate shot which he got yesterday. Patient is diagnosed with Schizoaffective D/O Bipolar, Autism Spectrum (by history). He lives with his parents in [place].     Patient is prescribed 200mg Haldol Dec IM 1/20/21 (received injection), Cogentin 2mg BID, Klonopin 1mg BID, Klonopin 1mg PRN QD, Haldol 5mg BID    He was pleading with the psychiatrist to give him valium and an antidepressant. Psychiatrist had this discussion with patient prior to therapy session which upset the patient. Per notes by therapist patient then endorsed suicidal ideation but did not endorse plan. He would not participate in safety planning. He stated demonic attacks were out of the control.     Called Sister Adri ([phone])- She reports they have a security camera because patient's mother has Alzheimer's in the house. She saw on the camera patient was taken from the house by police. On the camera she heard that the doctors called the police to come get him. Patient has had schizophrenia x 12 years. She was speaking to patient the other day and "he is feeling depressed and he stated there are times he thinks about dying. He did not endorse a suicide plan or intent. She reports patient always is depressed and will make these sort of statements. She stated she does not think patient will harm herself and she does not think patient needs to be hospitalized. However she stated she doesn't think his current medication regimen is helping him because he is overweight and only on Haldol. Patient used to go to [facility] but after his last episode he went to [facility]. She reports patent is depressed right now and she believes his medication regimen is making him feel depressed. He also has no friends and is overweight from the medication and she believes this is why the patient is depressed. 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 She described patient as "a beautiful mind." The patient is his parents primary care taker- cooks, cleans, buys them food.     She also has depression and her psychiatrist agrees that the patient isn't on the right medication. Patient is smart and went to [facility]. She stated she does not think patient needs to be hospitalized but needs different medication.     Patient takes Haldol Inj 50mg IM and Klonopin. He does not take oral medication regularly.     Reviewed Psyckes- On AOT until 2/27/21.  Hx of [facility] [place] 3/10-5/28 2020. Patient was last hospitalized at [facility] 1/4-2/27 2020.        [facility]  Reviewed Reference #: [id number]    08/28/2020	01/08/2021	dextroamp-amphetamin 30 mg tab	90	30	Xin Mayorga DO	Insurance	[facility]  01/04/2021	01/07/2021	dextroamphetamine er 15 mg cap	120	30	Xin Mayorga DO	Medicare	[id number]  12/30/2020	01/04/2021	triazolam 0.25 mg tablet	               30         15	Santi Sebastian MD	[id number]  12/23/2020	12/29/2020	clonazepam 1 mg tablet	60	30	Alverto Clark MD	[facility] Spoke with ABBEY Brewer ([phone])- She is the  of [facility] Outpatient. Patient told his therapist he was having thoughts of suicide and was feeling very depressed and could not  commit to safety. They spoke with Dr. Tiffany Romero and she said with her conversation with him patient was pressured and kept asking her for an antidepressant. She thinks patient may be abusing adderall and felt he needs to be evaluated in the ED. Patient has a hx of abusing adderall. Patient is on AOT and has been complaint with his Haldol Deconate shot which he got yesterday. Patient is diagnosed with Schizoaffective D/O Bipolar, Autism Spectrum (by history). He lives with his parents in [place].     Patient is prescribed 200mg Haldol Dec IM 1/20/21 (received injection), Cogentin 2mg BID, Klonopin 1mg BID, Klonopin 1mg PRN QD, Haldol 5mg BID    He was pleading with the psychiatrist to give him valium and an antidepressant. Psychiatrist had this discussion with patient prior to therapy session which upset the patient. Per notes by therapist patient then endorsed suicidal ideation but did not endorse plan. He would not participate in safety planning. He stated demonic attacks were out of the control.     Called Sister Adri ([phone])- She reports they have a security camera because patient's mother has Alzheimer's in the house. She saw on the camera patient was taken from the house by police. On the camera she heard that the doctors called the police to come get him. Patient has had schizophrenia x 12 years. She was speaking to patient the other day and he is feeling depressed and he stated there are times he thinks about dying. He did not endorse a suicide plan or intent. He denied wanting to kill himself. She reports patient always is depressed and will make these sort of statements. She stated she does not think patient will harm herself and she does not think patient needs to be hospitalized. However she stated she doesn't think his current medication regimen is helping him because he is overweight and only on Haldol. Patient used to go to [facility] but after his last episode he went to [facility]. She reports patent is depressed right now and she believes his medication regimen is making him feel depressed. He also has no friends and is overweight from the medication and she believes this is why the patient is depressed. 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 She described patient as "a beautiful mind." The patient is his parents primary care taker- cooks, cleans, buys them food.     She also has depression and her psychiatrist agrees that the patient isn't on the right medication. Patient is smart and went to [facility]. She stated she does not think patient needs to be hospitalized but needs different medication.     Patient takes Haldol Inj 50mg IM and Klonopin. He does not take oral medication regularly.     Reviewed Psyckes- On AOT until 2/27/21.  Hx of [facility] 3/10-5/28 2020. Patient was last hospitalized at [facility] 1/4-2/27 2020.    Spoke with Julián ([phone])- He reports he is best friends with his brother. He stated the patient has schizophrenia and he has low lows and high highs. He stated he lives with his parents who elderly and frail and he does well with them. Of late, patient was doing ok and has been on court ordered AOT. He is unhappy with patient's treatment at [facility]. He reports patient has been sad and depressed and he feels the patient does need an antidepressant. Patient always discusses spirts and guidance chronically. He stated patient always makes these sort of statements about demons and spirits and that is normal for him. Patient was hospitalized at [facility] and was put on AOT for haldol deconate. Patient last week was depressed and endorsed some passive suicidal ideation but no active suicidal ideation/plan/intent. He does think patient needs an antidepressant and therapy but he does not want patient hospitalized. Patient went and received his injection. Both his parents have alzheimers and him and his sister speak to the patient the most.       [facility]  Reviewed Reference #: [id number]    08/28/2020	01/08/2021	dextroamp-amphetamin 30 mg tab	90	30	Xin Mayorga DO	Insurance	[facility]  01/04/2021	01/07/2021	dextroamphetamine er 15 mg cap	120	30	Xin Mayorga DO	Medicare	[id number]  12/30/2020	01/04/2021	triazolam 0.25 mg tablet	               30         15	Santi Sebastian MD	[id number]  12/23/2020	12/29/2020	clonazepam 1 mg tablet	60	30	Alverto Clark MD	[facility] Spoke with ABBEY Brewer ([phone])- She is the  of [facility] Outpatient. Patient told his therapist he was having thoughts of suicide and was feeling very depressed and could not  commit to safety. They spoke with Dr. Tiffany Romero and she said with her conversation with him patient was pressured and kept asking her for an antidepressant. She thinks patient may be abusing adderall and felt he needs to be evaluated in the ED. Patient has a hx of abusing adderall. Patient is on AOT and has been complaint with his Haldol Deconate shot which he got yesterday. Patient is diagnosed with Schizoaffective D/O Bipolar, Autism Spectrum (by history). He lives with his parents in [place].     Patient is prescribed 200mg Haldol Dec IM 1/20/21 (received injection), Cogentin 2mg BID, Klonopin 1mg BID, Klonopin 1mg PRN QD, Haldol 5mg BID. He is compliant.     Today per notes he was pleading with the psychiatrist to give him valium and an antidepressant. Psychiatrist said no (concerns for laesia) and had this discussion with patient prior to therapy session which upset the patient. Per notes by therapist patient then endorsed suicidal ideation but did not endorse plan. He would not participate in safety planning. He stated demonic attacks were out of the control.     Called Sister Adri ([phone])- She reports they have a security camera because patient's mother has Alzheimer's. She saw on the camera patient was taken from the house by police. On the camera she heard that the doctors called the police to come get him. Patient has had schizophrenia x 12 years. She was speaking to patient the other day and he is feeling depressed and he stated there are times he thinks about dying. He did not endorse a suicide plan or intent. He denied wanting to kill himself. She reports patient always is depressed and will make these sort of statements. She stated she does not think patient will harm himself and has no safety concerns. She does not think patient needs to be hospitalized. However she stated she doesn't think his current medication regimen is helping him because he is overweight and only on Haldol. Patient used to go to [facility] but after his last episode he went to [facility]. She reports patent is depressed right now and she believes his medication regimen is making him feel depressed due to weight gain and he has no friends. She reports the patient is always grandiose and loud- chronic presentation and personality related. 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 She described patient as "a beautiful mind." The patient is his parents primary care taker- cooks, cleans, buys them food. He is caring for self.     She also has depression and her psychiatrist agrees that the patient isn't on the right medication. Patient is smart and went to [facility]. She stated she does not think patient needs to be hospitalized but needs different medication.     Patient takes Haldol Inj 50mg IM and Klonopin. She doesn't know if he takes oral medication.     Reviewed Psyckes- On AOT until 2/27/21.  Hx of [facility] [place] 3/10-5/28 2020. Patient was last hospitalized at [facility] 1/4-2/27 2020.    Spoke with brother Julián ([phone])- He reports he is best friends with his brother. He stated the patient has schizophrenia and he has low lows and high highs. He stated he lives with his parents who are elderly and frail and he does well with them. Of late, patient was doing ok and has been on court ordered AOT. He is unhappy with patient's treatment at [facility]. Patient always discusses spirts and guidance chronically. He stated patient always makes these sort of statements about demons and spirits and that is normal for him. Patient was hospitalized at [facility] and was put on AOT for haldol deconate. Patient last week was depressed and endorsed some passive suicidal ideation but no active suicidal ideation/plan/intent. He does think patient needs an antidepressant and therapy but he does not want/think  patient needs to be hospitalized. Patient went and received his injection. Both his parents have alzheimers and are not good historians.    Extensively discussed safety plan with brother & sister who agreed to remove sharp objects and loose pills.       [facility] Reviewed Reference #: [id number]    08/28/2020	01/08/2021	dextroamp-amphetamin 30 mg tab	90	30	Xin Mayorga DO	Insurance	[facility]  01/04/2021	01/07/2021	dextroamphetamine er 15 mg cap	120	30	Xin Mayorga DO	Medicare	[id number]  12/30/2020	01/04/2021	triazolam 0.25 mg tablet	               30         15	Santi Sebastian MD	[facility][id number]  12/23/2020	12/29/2020	clonazepam 1 mg tablet	60	30	Alverto Clark MD	Insurance	[facility] Spoke with ABBEY Brewer ([phone])- She is the  of [facility]. Patient told his therapist he was having thoughts of suicide and was feeling very depressed and could not  commit to safety. They spoke with Dr. Tiffany Romero and she said with her conversation with him patient was pressured and kept asking her for an antidepressant. She thinks patient may be abusing adderall and felt he needs to be evaluated in the ED. Patient has a hx of abusing adderall. Patient is on AOT and has been complaint with his Haldol Deconate shot which he got yesterday. Patient is diagnosed with Schizoaffective D/O Bipolar, Autism Spectrum (by history). He lives with his parents in [place].     Patient is prescribed 200mg Haldol Dec IM 1/20/21 (received injection), Cogentin 2mg BID, Klonopin 1mg BID, Klonopin 1mg PRN QD, Haldol 5mg BID. He is compliant.     Today per notes he was pleading with the psychiatrist to give him valium and an antidepressant. Psychiatrist said no (concerns for alesia) and had this discussion with patient prior to therapy session which upset the patient. Per notes by therapist patient then endorsed suicidal ideation but did not endorse plan. He would not participate in safety planning. He stated demonic attacks were out of the control.     Called Sister Adri ([phone])- She reports they have a security camera because patient's mother has Alzheimer's. She saw on the camera patient was taken from the house by police. On the camera she heard that the doctors called the police to come get him. Patient has had schizophrenia x 12 years. She was speaking to patient the other day and he is feeling depressed and he stated there are times he thinks about dying. He did not endorse a suicide plan or intent. He denied wanting to kill himself. She reports patient always is depressed and will make these sort of statements chronically. She stated she does not think patient will harm himself and has no safety concerns. She does not think patient needs to be hospitalized. However she stated she doesn't think his current medication regimen is helping him because he is overweight and only on Haldol. Patient used to go to [facility] but after his last episode he went to [facility]. She reports patent is depressed right now and she believes his medication regimen is making him feel depressed due to weight gain and he has no friends. She reports the patient is always grandiose and loud- chronic presentation and personality related. 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 She described patient as "a beautiful mind." The patient is his parents primary care taker- cooks, cleans, buys them food. He is caring for self.     She also has depression and her psychiatrist agrees that the patient isn't on the right medication. Patient is smart and went to [facility]. She stated she does not think patient needs to be hospitalized but needs different medication.     Patient takes Haldol Inj 50mg IM and Klonopin. She doesn't know if he takes oral medication.     Reviewed Psyckes- On AOT until 2/27/21.  Hx of [facility] [place] 3/10-5/28 2020. Patient was last hospitalized at [facility] 1/4-2/27 2020.    Spoke with brother Julián ([phone])- He reports he is best friends with his brother. He stated the patient has schizophrenia and he has low lows and high highs. He stated he lives with his parents who are elderly and frail and he does well with them. Of late, patient was doing ok and has been on court ordered AOT. He is unhappy with patient's treatment at [facility]. Patient always discusses spirts and guidance chronically. He stated patient always makes these sort of statements about demons and spirits and that is normal for him. Patient was hospitalized at [facility] and was put on AOT for haldol deconate. Patient last week was depressed and endorsed some passive suicidal ideation but no active suicidal ideation/plan/intent. He does think patient needs an antidepressant and therapy but he does not want/think  patient needs to be hospitalized. Patient went and received his injection. Both his parents have alzheimers and are not good historians.    Extensively discussed safety plan with brother & sister who agreed to remove sharp objects and loose pills.       [facility] Reviewed Reference #: [id number]    08/28/2020	01/08/2021	dextroamp-amphetamin 30 mg tab	90	30	Xin Mayorga DO	Insurance	[facility]  01/04/2021	01/07/2021	dextroamphetamine er 15 mg cap	120	30	Xin Mayorga DO	Medicare	[id number]  12/30/2020	01/04/2021	triazolam 0.25 mg tablet	               30         15	Santi Sebastian MD	[id number]  12/23/2020	12/29/2020	clonazepam 1 mg tablet	60	30	Alverto Clark MD	Insurance	[facility]

## 2021-01-21 NOTE — ED PROVIDER NOTE - CLINICAL SUMMARY MEDICAL DECISION MAKING FREE TEXT BOX
45 year old male history of schizoaffective disorder presents after 911 activated for suicidal statements.  Medical evaluation performed. There is no clinical evidence of intoxication or any acute medical problem requiring immediate intervention. Will contact therapist to obtain collateral and consult psych if needed.

## 2021-01-21 NOTE — ED BEHAVIORAL HEALTH ASSESSMENT NOTE - HPI (INCLUDE ILLNESS QUALITY, SEVERITY, DURATION, TIMING, CONTEXT, MODIFYING FACTORS, ASSOCIATED SIGNS AND SYMPTOMS)
46 y/o single white male, domiciled with parents, no dependents, disabled, history of Schizoaffective Disorder- Bipolar Type, Autism Spectrum Disorder by hx, in outpatient psychiatric treatment at [facility] and on APT, multiple past psychiatric admissions- last at [facility] 1/2-2/27 2020, history of suicide attempt in 2013 (stabbed self in chest), no violence hx, no legal problems. Per clinic history of misusing Adderall. Patient reports he is prescribed this medication ([place] this), no other drug history, no ETOH, no DTs, PMH of sleep apnea & chronic fatigue syndrome, BIB EMS from outpatient clinic for suicidal ideation.     Patient reports he came to the ED due to a misunderstanding. He stated he requested an anti depressant from his psychiatrist because he believes he has seasonal affective disorder. He reports she refused and was speaking to his therapist and became upset and "I didn't say I wanted to kill myself but I said I don't know what I will do and this misinterpreted that." Patient reports he has been feeling depressed on and off the last few months related to the weather. He stated he also feels isolated due to the covid pandemic. 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 He reports he cooks, cleans and takes them to the store. He reports when it is bright and clarence he feels fine but when it is dark and gloomy he doesn't and so thinks an antidepressant would help.    Patient adamantly denies SI/HI/SIB/intent. He stated he got upset and therapist "misinterpreted my words." Patient stated he has been eating, sleeping and compliant with medication. 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 He listens to [employer] and stated he has multiple college degrees (all verified with sister). Patient has slightly pressured speech and is grandiose but sister reports he always presents this way.     Patient denies any hallucinations, does not report any delusional thought content, denies thought insertion/withdrawal, denies referential thought processes & is not paranoid on interview.  Patient does not report nor exhibit any signs of alesia, including irritable or elevated mood,, risk-taking behaviors, increase in productivity or agitation. Patient denies any depressive symptoms including depressed mood, anhedonia, changes in energy/concentration/appetite, sleep disturbances, preoccupation with death or feelings of guilt. Patient adamantly denies SI, intent or plan; denies any HI, violent thoughts.     See  note for collateral information. 46 y/o single white male, domiciled with parents, no dependents, disabled, history of Schizoaffective Disorder- Bipolar Type, Autism Spectrum Disorder by hx, in outpatient psychiatric treatment at [facility] and on AOT, multiple past psychiatric admissions- last at [facility] 1/4-2/27 2020, history of suicide attempt in 2013 (stabbed self in chest), no violence hx, no legal problems. Per clinic -history of misusing Adderall. Patient reports he is prescribed this medication ([facility]  verifies this), no other drug history, no ETOH, no DTs, PMH of sleep apnea & chronic fatigue syndrome, BIB EMS called by outpatient clinic for suicidal ideation.     Patient reports he came to the ED due to a misunderstanding. He stated he requested an anti depressant from his psychiatrist because he believes he has seasonal affective disorder. He reports she refused and was speaking to his therapist and became upset and "I didn't say I wanted to kill myself or was suicidal but I said I don't know what I will do and they misinterpreted that." Patient reports he has been feeling depressed on and off the last few months related to the weather. He stated he also feels isolated due to the covid pandemic. 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 He reports he cooks, cleans and takes them to the store. He reports when it is bright and clarence he feels fine but when it is dark and gloomy he feels sad and so he thinks an antidepressant would help.    Patient adamantly denies SI/HI/SIB/intent. He stated he got upset and therapist "misinterpreted my words." He denies making passive suicidal statements to siblings stating he just tells them he's depressed sometimes. Patient stated he has been eating, sleeping and compliant with medication. 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 He listens to [employer] and stated he has multiple college degrees (all verified with sister). Patient has slightly pressured speech and is grandiose but sister reports he always presents this way and it is personality. He discussed his protective factors- family, art, being caretaker of parents. He was future oriented discussing goals to make a film and plan to celebrate tonight/tomorrow with special dinner for parents because they just got their COVID vaccines.     Patient denies any hallucinations, does not report any delusional thought content, denies thought insertion/withdrawal, denies referential thought processes & is not paranoid on interview.  Patient does not report nor exhibit irritable or elevated mood, risk-taking behaviors, increase in productivity or agitation. Patient denies anhedonia, changes in energy/concentration/appetite, sleep disturbances, preoccupation with death or feelings of guilt. Patient adamantly denies SI, intent or plan; denies any HI, violent thoughts.     See  note for collateral information. 46 y/o single white male, domiciled with parents, no dependents, disabled, history of Schizoaffective Disorder- Bipolar Type, Autism Spectrum Disorder by hx, in outpatient psychiatric treatment at [facility] and on AOT, multiple past psychiatric admissions- last at [facility] 1/4-2/27 2020, history of suicide attempt in 2013 (stabbed self in chest), no violence hx, no legal problems. Per clinic -history of misusing Adderall. Patient reports he is [place] ([place]), no other drug history, no ETOH, no DTs, PMH of sleep apnea & chronic fatigue syndrome, BIB EMS called by outpatient clinic for suicidal ideation.     Patient reports he came to the ED due to a misunderstanding. He stated he requested an antidepressant from his psychiatrist because he believes he has seasonal affective disorder. He reports she refused and was speaking to his therapist and became upset and "I didn't say I wanted to kill myself or was suicidal but I said I don't know what I will do and they misinterpreted that." Patient reports he has been feeling depressed on and off the last few months related to the weather. He stated he also feels isolated due to the covid pandemic. 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 He reports he cooks, cleans and takes them to the store. He reports when it is bright and clarence he feels fine but when it is dark and gloomy he feels sad and so he thinks an antidepressant would help.    Patient adamantly denies SI/HI/SIB/intent. He stated he got upset and therapist "misinterpreted my words." He denies making passive suicidal statements to siblings stating he just tells them he's depressed sometimes. Patient stated he has been eating, sleeping and compliant with medication. 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 He listens to [employer] and stated he has multiple college degrees (all verified with sister). Patient has slightly pressured speech and is grandiose but sister reports he always presents this way and it is personality. He discussed his protective factors- family, art, being caretaker of parents. He was future oriented discussing goals to make a film and plan to celebrate tonight/tomorrow with special dinner for parents because they just got their COVID vaccines.     Patient denies any hallucinations, does not report any delusional thought content, denies thought insertion/withdrawal, denies referential thought processes & is not paranoid on interview.  Patient does not report nor exhibit irritable or elevated mood, risk-taking behaviors, increase in productivity or agitation. Patient denies anhedonia, changes in energy/concentration/appetite, sleep disturbances, preoccupation with death or feelings of guilt. Patient adamantly denies SI, intent or plan; denies any HI, violent thoughts.     See  note for collateral information.

## 2021-01-21 NOTE — ED BEHAVIORAL HEALTH ASSESSMENT NOTE - OTHER
superficially cooperative "great" issues with primary support [facility], Brother, Sister issues with primary support, isolation

## 2021-01-21 NOTE — ED BEHAVIORAL HEALTH ASSESSMENT NOTE - CASE SUMMARY
Patient is a 46 y/o single white man, domiciled with parents, no dependents, disabled, history of Schizoaffective Disorder- Bipolar Type, Autism Spectrum Disorder by hx, in outpatient psychiatric treatment at [facility] and on AOT, multiple past psychiatric admissions- last at [facility] 1/4-2/27 2020, history of suicide attempt in 2013 (stabbed self in chest), no violence hx, no legal problems. Per clinic -history of misusing Adderall. Patient reports he is prescribed this medication ([facility]  verifies this), no other drug history, no ETOH, no DTs, PMH of sleep apnea & chronic fatigue syndrome, BIB EMS called by outpatient clinic for suicidal ideation.     Patient adamantly denies acute active suicidal ideations.  Patient denies acute symptoms of depression, alesia, anxiety, psychosis, suicidal/homicidal ideations, intent or plans, denies auditory/visual hallucinations.  Patient does not represent an imminent threat of danger to self or others at this time.  Patient does not meet criteria for inpatient involuntary hospitalization.  Patient refused voluntary admission.  Patient will be discharged home and family agrees to discharge disposition.  No acute safety concerns by patient or family.

## 2021-01-21 NOTE — ED ADULT NURSE NOTE - OBJECTIVE STATEMENT
Patient reports that he called a  therapist toady and stated to her that he felt increasingly anxious, depressed, sad and feels that he needs a dose adjustment.  When therapist stated he would have to wait until next week, patient reports saying he can not wait anymore and feels he wants to kill himself. Calm and cooperative on arrival. Sancho SI, HI, AV, VH, ETOH, drugs.

## 2021-01-21 NOTE — ED BEHAVIORAL HEALTH ASSESSMENT NOTE - SAFETY PLAN ADDT'L DETAILS
Safety plan discussed with.../Education provided regarding environmental safety / lethal means restriction/Provision of National Suicide Prevention Lifeline 7-063-840-DYUD (1461)

## 2021-01-21 NOTE — ED PROVIDER NOTE - PATIENT PORTAL LINK FT
You can access the FollowMyHealth Patient Portal offered by [facility] by registering at the following website: [email or web address]. By joining [facility]’s FollowMyHealth portal, you will also be able to view your health information using other applications (apps) compatible with our system.

## 2021-01-21 NOTE — ED PROVIDER NOTE - OBJECTIVE STATEMENT
45 year old male history of schizoaffective disorder presents after 911 activated for suicidal statements.  Patient reports that he called [facility] [phone]), spoke to therapist Tana Gallo, stated to her that he felt increasingly anxious, depressed, sad and feels that he needs a dose adjustment.  When therapist stated he would have to wait until next week, patient alledgedly made statement that he can not wait anymore and feels he wants to kill himself.  Pt reports that he has multiple stressors, including financial, family and weight loss issues.  Patient states now he is not suicidal and never was.  Patient states that is complaint with meds - haldol 5mg, cogentin 1mg and Klonopin 2mg, last dose this am.  Patient denies HI/AH/VH.  Patient denies illicit drugs or ETOH, no physical complaints or concerns.

## 2021-01-22 NOTE — ED BEHAVIORAL HEALTH NOTE - BEHAVIORAL HEALTH NOTE
High Risk Log:  Worker called patient [phone] and spoke to patient father who states that patient is not home and he cannot remember cell phone number. Worker left sw call back  number.

## 2021-01-23 NOTE — ED BEHAVIORAL HEALTH NOTE - BEHAVIORAL HEALTH NOTE
High Risk Log:  Worker called patient [phone] and spoke to patient father who states that patient is not home and he cannot remember cell phone number. Worker left sw call back  number.

## 2021-01-24 NOTE — ED BEHAVIORAL HEALTH NOTE - BEHAVIORAL HEALTH NOTE
High Risk Log:  Worker called [phone] and spoke to patient who states "I am totally fine". He declines follow up.

## 2021-03-01 NOTE — ED BEHAVIORAL HEALTH ASSESSMENT NOTE - FAMILY HISTORY OF SUBSTANCE ABUSE
Alert-The patient is alert, awake and responds to voice. The patient is oriented to time, place, and person. The triage nurse is able to obtain subjective information. Unable to assess

## 2021-04-01 ENCOUNTER — NON-APPOINTMENT (OUTPATIENT)
Age: 46
End: 2021-04-01

## 2021-04-01 RX ORDER — TESTOSTERONE CYPIONATE 200 MG/ML
200 VIAL (ML) INTRAMUSCULAR
Qty: 3 | Refills: 0 | Status: ACTIVE | COMMUNITY
Start: 2020-08-18

## 2021-04-06 ENCOUNTER — APPOINTMENT (OUTPATIENT)
Dept: ENDOCRINOLOGY | Facility: CLINIC | Age: 46
End: 2021-04-06
Payer: MEDICARE

## 2021-04-06 VITALS
OXYGEN SATURATION: 97 % | TEMPERATURE: 98.6 F | RESPIRATION RATE: 16 BRPM | SYSTOLIC BLOOD PRESSURE: 126 MMHG | DIASTOLIC BLOOD PRESSURE: 82 MMHG | HEIGHT: 74 IN | HEART RATE: 117 BPM | WEIGHT: 296 LBS | BODY MASS INDEX: 37.99 KG/M2

## 2021-04-06 DIAGNOSIS — N52.9 MALE ERECTILE DYSFUNCTION, UNSPECIFIED: ICD-10-CM

## 2021-04-06 DIAGNOSIS — R79.89 OTHER SPECIFIED ABNORMAL FINDINGS OF BLOOD CHEMISTRY: ICD-10-CM

## 2021-04-06 DIAGNOSIS — E66.9 OBESITY, UNSPECIFIED: ICD-10-CM

## 2021-04-06 DIAGNOSIS — R73.03 PREDIABETES.: ICD-10-CM

## 2021-04-06 PROCEDURE — 36415 COLL VENOUS BLD VENIPUNCTURE: CPT

## 2021-04-06 PROCEDURE — 99214 OFFICE O/P EST MOD 30 MIN: CPT | Mod: 25

## 2021-04-06 PROCEDURE — 99072 ADDL SUPL MATRL&STAF TM PHE: CPT

## 2021-04-07 DIAGNOSIS — E78.5 HYPERLIPIDEMIA, UNSPECIFIED: ICD-10-CM

## 2021-04-07 RX ORDER — ROSUVASTATIN CALCIUM 10 MG/1
10 TABLET, FILM COATED ORAL DAILY
Qty: 90 | Refills: 0 | Status: ACTIVE | COMMUNITY
Start: 2021-04-07 | End: 1900-01-01

## 2021-04-22 PROBLEM — N52.9 ERECTILE DYSFUNCTION: Status: ACTIVE | Noted: 2020-04-21

## 2021-04-22 NOTE — PHYSICAL EXAM
[Alert] : alert [Well Nourished] : well nourished [Well Developed] : well developed [No Acute Distress] : no acute distress [Normal Sclera/Conjunctiva] : normal sclera/conjunctiva [EOMI] : extra ocular movement intact [Normal Oropharynx] : the oropharynx was normal [No Proptosis] : no proptosis [Thyroid Not Enlarged] : the thyroid was not enlarged [No Thyroid Nodules] : no palpable thyroid nodules [No Respiratory Distress] : no respiratory distress [No Accessory Muscle Use] : no accessory muscle use [Clear to Auscultation] : lungs were clear to auscultation bilaterally [Normal S1, S2] : normal S1 and S2 [Normal Rate] : heart rate was normal [Regular Rhythm] : with a regular rhythm [No Edema] : no peripheral edema [Pedal Pulses Normal] : the pedal pulses are present [Normal Bowel Sounds] : normal bowel sounds [Not Tender] : non-tender [Not Distended] : not distended [Soft] : abdomen soft [Normal Anterior Cervical Nodes] : no anterior cervical lymphadenopathy [Normal Posterior Cervical Nodes] : no posterior cervical lymphadenopathy [No Spinal Tenderness] : no spinal tenderness [Spine Straight] : spine straight [No Stigmata of Cushings Syndrome] : no stigmata of Cushings Syndrome [Normal Gait] : normal gait [Normal Strength/Tone] : muscle strength and tone were normal [No Rash] : no rash [Normal Reflexes] : deep tendon reflexes were 2+ and symmetric [No Tremors] : no tremors [Oriented x3] : oriented to person, place, and time [Acanthosis Nigricans] : no acanthosis nigricans

## 2021-04-22 NOTE — HISTORY OF PRESENT ILLNESS
[FreeTextEntry1] : Mr Lenz is a 45 year old male who presents today with history of chronic fatigue and sleep deprivation.  \par At present, patient is interested in going back on testosterone replacement. He would like to go on a testosterone injection weekly for energy boost. He does take testosterone supplement??, fat burner, and black turmeric per [employer] nutritionist.  \par Too, reports weakness, difficulty moving extremities, patient states he is mostly "bed-ridden." He would like to start on anabolic steroids, he did take anabolic steroids during his teenage years. \par \par Denies COVID infection. \par Pt reports irregular habits. He does report increased stress, he has not been taking supplements for the past two days. He is taking thyroid and adrenal supplements twice a week from [employer]. \par Has received first dose of Pfizer COVID vaccine. \par \par  \par NP had attempted many times to contact patient re; labs done  set 2020. NP  discussed the following:TFTS, ls,ferritin,prolactin, sodium, potassium, liver and kidneys,iron, are wnl. H&H, cortisol pm , a1c , vitamin d, cholesterol, triglycerides, ldl, b12, are elevated. Patient advised to see hematologistt. Vitamin d 88 and patient to stop vitamin d for two weeks then resume half dosage of vitamin d.  Patient to hold testosterone and see urologist. Patient to decrease vitamin b12 dosage in half and start Crestor 10 mg one tablet daily  with coenzyme 200 mg po qd.  made aware new labs will be taken today. \par

## 2021-04-23 ENCOUNTER — NON-APPOINTMENT (OUTPATIENT)
Age: 46
End: 2021-04-23

## 2021-04-24 LAB
ALBUMIN SERPL ELPH-MCNC: 5.1 G/DL
ALP BLD-CCNC: 113 U/L
ALT SERPL-CCNC: 27 U/L
ANION GAP SERPL CALC-SCNC: 11 MMOL/L
AST SERPL-CCNC: 21 U/L
BASOPHILS # BLD AUTO: 0.05 K/UL
BASOPHILS NFR BLD AUTO: 0.7 %
BILIRUB SERPL-MCNC: 0.7 MG/DL
BUN SERPL-MCNC: 18 MG/DL
CALCIUM SERPL-MCNC: 10.6 MG/DL
CHLORIDE SERPL-SCNC: 103 MMOL/L
CHOLEST SERPL-MCNC: 294 MG/DL
CO2 SERPL-SCNC: 27 MMOL/L
CREAT SERPL-MCNC: 1.08 MG/DL
DHEA-SULFATE, SERUM: 289 UG/DL
EOSINOPHIL # BLD AUTO: 0.12 K/UL
EOSINOPHIL NFR BLD AUTO: 1.7 %
ESTIMATED AVERAGE GLUCOSE: 123 MG/DL
FREE T3-ESOTERIX: 3.2 PG/ML
FREE T4-ESOTERIX: 1.38 NG/DL
FSH: 1.4 MIU/ML
GLUCOSE SERPL-MCNC: 99 MG/DL
HBA1C MFR BLD HPLC: 5.9 %
HCT VFR BLD CALC: 52.5 %
HDLC SERPL-MCNC: 41 MG/DL
HGB BLD-MCNC: 17.1 G/DL
IMM GRANULOCYTES NFR BLD AUTO: 0.1 %
LDLC SERPL CALC-MCNC: 204 MG/DL
LH SERPL-ACNC: 3.3 IU/L
LYMPHOCYTES # BLD AUTO: 2.02 K/UL
LYMPHOCYTES NFR BLD AUTO: 29 %
MAN DIFF?: NORMAL
MCHC RBC-ENTMCNC: 29.8 PG
MCHC RBC-ENTMCNC: 32.6 GM/DL
MCV RBC AUTO: 91.5 FL
MONOCYTES # BLD AUTO: 0.55 K/UL
MONOCYTES NFR BLD AUTO: 7.9 %
NEUTROPHILS # BLD AUTO: 4.22 K/UL
NEUTROPHILS NFR BLD AUTO: 60.6 %
NONHDLC SERPL-MCNC: 253 MG/DL
PLATELET # BLD AUTO: 272 K/UL
POTASSIUM SERPL-SCNC: 5 MMOL/L
PROLACTIN SERPL-MCNC: 16.8 NG/ML
PROT SERPL-MCNC: 8 G/DL
PSA SERPL-MCNC: 3.78 NG/ML
RBC # BLD: 5.74 M/UL
RBC # FLD: 13.2 %
SHBG-ESOTERIX: 17.7 NMOL/L
SODIUM SERPL-SCNC: 141 MMOL/L
TESTOST BND SERPL-MCNC: 7.6 PG/ML
TESTOST SERPL-MCNC: 204.2 NG/DL
TRIGL SERPL-MCNC: 248 MG/DL
TSH SERPL-ACNC: 1.22 UIU/ML
WBC # FLD AUTO: 6.97 K/UL

## 2022-04-11 PROBLEM — Z11.59 SCREENING FOR VIRAL DISEASE: Status: ACTIVE | Noted: 2020-07-23
